# Patient Record
Sex: MALE | Race: WHITE | NOT HISPANIC OR LATINO | Employment: OTHER | URBAN - NONMETROPOLITAN AREA
[De-identification: names, ages, dates, MRNs, and addresses within clinical notes are randomized per-mention and may not be internally consistent; named-entity substitution may affect disease eponyms.]

---

## 2017-01-05 ENCOUNTER — TELEPHONE (OUTPATIENT)
Dept: UROLOGY | Facility: CLINIC | Age: 72
End: 2017-01-05

## 2017-01-05 NOTE — TELEPHONE ENCOUNTER
Called the patient and he stated that he has received the medication.      ----- Message from Sheree Verduzco MA sent at 1/3/2017  1:14 PM CST -----  [facility] hasnt received any prescriptions   Please advise

## 2017-01-12 ENCOUNTER — TELEPHONE (OUTPATIENT)
Dept: UROLOGY | Facility: CLINIC | Age: 72
End: 2017-01-12

## 2017-01-12 NOTE — TELEPHONE ENCOUNTER
----- Message from Alison Garcia sent at 1/12/2017  2:07 PM CST -----  Contact: MILEY LISA  Patient of Dr Haro. He put him on a medication for bladder control and it does not seem to be working. He especially has trouble at night. Can call home number [phone].

## 2017-01-12 NOTE — TELEPHONE ENCOUNTER
Patient advised to continue medication until follow up and he can report progress at that time. He needs to be on medication for three months to be documented as failed. Patient verbalized understanding.

## 2017-02-07 ENCOUNTER — OFFICE VISIT (OUTPATIENT)
Dept: NEUROLOGY | Facility: CLINIC | Age: 72
End: 2017-02-07

## 2017-02-07 VITALS
DIASTOLIC BLOOD PRESSURE: 80 MMHG | HEIGHT: 73 IN | WEIGHT: 253 LBS | BODY MASS INDEX: 33.53 KG/M2 | HEART RATE: 72 BPM | SYSTOLIC BLOOD PRESSURE: 138 MMHG

## 2017-02-07 DIAGNOSIS — I10 ESSENTIAL HYPERTENSION: ICD-10-CM

## 2017-02-07 DIAGNOSIS — G20 PARKINSON'S DISEASE (HCC): Primary | ICD-10-CM

## 2017-02-07 DIAGNOSIS — E78.5 HYPERLIPIDEMIA, UNSPECIFIED HYPERLIPIDEMIA TYPE: ICD-10-CM

## 2017-02-07 PROCEDURE — 99214 OFFICE O/P EST MOD 30 MIN: CPT | Performed by: CLINICAL NURSE SPECIALIST

## 2017-02-07 RX ORDER — AMANTADINE HYDROCHLORIDE 100 MG/1
100 CAPSULE, GELATIN COATED ORAL 2 TIMES DAILY
Qty: 60 CAPSULE | Refills: 2 | Status: SHIPPED | OUTPATIENT
Start: 2017-02-07

## 2017-02-07 NOTE — PROGRESS NOTES
Subjective     Chief Complaint   Patient presents with   • Tremors     tremors are worse         Darrick Max is a 71 y.o. male right handed retiree and . He was last seen 10/2016. He is here today for a problem visit.  He has had increased tremor between doses. Taking sinemet 25/100 0700, 1100, 1500 and CR 50/200 at 2000. At last visit patient had admitted to missing or prolonging time between doses and this has since been improved and takes more consistently.  He also is having more freezing gait. He was to start [employer] but unfortunately was not started.  He does occasionally get choked on food/liquids.        Dementia is well controlled with Aricept and wife denies decline.     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 He did run of the Klonopin and there was worsening symptoms but wife states as long as doses are not missed he does very well.        Parkinson's Disease   This is a chronic problem. The problem has been unchanged. Pertinent negatives include no chest pain, coughing, fatigue, fever, nausea, sore throat, vomiting or weakness. Associated symptoms comments: Bradykinesia, mask face, tremor. Now reporting a freezing gait and increase tremor between doses.   Dementia   This is a chronic problem. The problem has been unchanged. Pertinent negatives include no chest pain, coughing, fatigue, fever, nausea, sore throat, vomiting or weakness.        Current Outpatient Prescriptions   Medication Sig Dispense Refill   • aspirin 81 MG EC tablet Take 81 mg by mouth Daily.     • Boswellia-Glucosamine-Vit D (GLUCOSAMINE COMPLEX PO) Take 1,500 mg by mouth daily with breakfast.     • carbidopa-levodopa (SINEMET)  MG per tablet Take 1 tablet by mouth 3 (three) times a day.     • carbidopa-levodopa CR (SINEMET CR)  MG per CR tablet Take 1 tablet by mouth Every Night.     • clonazePAM (KlonoPIN) 0.5 MG tablet Take 1 tablet by mouth every night. 30 tablet 2   • clopidogrel  (PLAVIX) 75 MG tablet Take 1 tablet by mouth daily. 30 tablet 11   • coenzyme Q10 100 MG capsule Take 100 mg by mouth daily.     • docusate sodium (COLACE) 250 MG capsule Take 250 mg by mouth daily.     • donepezil (ARICEPT) 10 MG tablet Take 10 mg by mouth every night.     • furosemide (LASIX) 20 MG tablet Take 20 mg by mouth Daily.     • lisinopril (PRINIVIL,ZESTRIL) 40 MG tablet Take 40 mg by mouth daily.     • MEGARED OMEGA-3 KRILL OIL PO Take  by mouth every night.     • metoprolol tartrate (LOPRESSOR) 50 MG tablet Take 50 mg by mouth 2 (two) times a day.     • Mirabegron ER (MYRBETRIQ) 50 MG tablet sustained-release 24 hour Take 50 mg by mouth Daily. 30 tablet 11   • mirtazapine (REMERON) 30 MG tablet Take 30 mg by mouth every night. Takes half nightly     • nitroglycerin (NITROSTAT) 0.4 MG SL tablet Place 1 tablet under the tongue every 5 (five) minutes as needed for chest pain. Take no more than 3 doses in 15 minutes. 30 tablet 2   • pravastatin (PRAVACHOL) 40 MG tablet Take 20 mg by mouth every night.     • tamsulosin (FLOMAX) 0.4 MG capsule 24 hr capsule Take 1 capsule by mouth Every Night.     • amantadine (SYMMETREL) 100 MG capsule Take 1 capsule by mouth 2 (Two) Times a Day. 60 capsule 2     No current facility-administered medications for this visit.        Past Medical History   Diagnosis Date   • Arrhythmia    • Arthritis of knee, right    • BPH (benign prostatic hypertrophy)    • CAD (coronary artery disease)    • Hyperlipidemia    • Hypertension    • Parkinson disease    • Parkinson's disease        Past Surgical History   Procedure Laterality Date   • Coronary angioplasty with stent placement     • Tonsillectomy     • Pr rt/lt heart catheters N/A 9/23/2016     Procedure: Percutaneous Coronary Intervention;  Surgeon: Pedro Singh MD;  Location: [facility] CATH INVASIVE LOCATION;  Service: Cardiovascular   • Cardiac catheterization N/A 9/23/2016     Procedure: Left Heart Cath;  Surgeon: Juan J Salvador,  "MD;  Location:  [facility] CATH INVASIVE LOCATION;  Service:    • Cardiac catheterization N/A 9/23/2016     Procedure: Stent BMS coronary;  Surgeon: Pedro Singh MD;  Location:  [facility] CATH INVASIVE LOCATION;  Service:        family history includes Alzheimer's disease in his mother; Diabetes in his father; Hypertension in his mother.    Social History   Substance Use Topics   • Smoking status: Never Smoker   • Smokeless tobacco: Never Used   • Alcohol use No       Review of Systems   Constitutional: Negative.  Negative for fatigue and fever.   HENT: Negative.  Negative for drooling and sore throat.    Eyes: Negative.    Respiratory: Negative.  Negative for cough and choking.    Cardiovascular: Negative for chest pain.   Gastrointestinal: Negative.  Negative for constipation, diarrhea, nausea and vomiting.   Endocrine: Negative.    Genitourinary: Positive for dysuria.   Musculoskeletal: Positive for gait problem (shuffled gait). Negative for back pain.   Skin: Negative.    Allergic/Immunologic: Negative.    Neurological: Positive for tremors. Negative for dizziness, weakness and light-headedness. Speech difficulty: slow hypophonic speech.   Hematological: Negative.    Psychiatric/Behavioral: Negative.  Negative for agitation and confusion.   All other systems reviewed and are negative.      Objective     Visit Vitals   • /80   • Pulse 72   • Ht 73\" (185.4 cm)   • Wt 253 lb (115 kg)   • BMI 33.38 kg/m2   , Body mass index is 33.38 kg/(m^2).    Physical Exam   Constitutional: He is oriented to person, place, and time. Vital signs are normal. He appears well-developed and well-nourished.   HENT:   Head: Normocephalic and atraumatic.   Right Ear: Hearing and external ear normal.   Left Ear: Hearing and external ear normal.   Nose: Nose normal.   Mouth/Throat: Oropharynx is clear and moist and mucous membranes are normal.   Eyes: EOM and lids are normal. Pupils are equal, round, and reactive to light.   Neck: Normal " range of motion. Neck supple. Carotid bruit is not present.   Cardiovascular: Normal rate, regular rhythm, S1 normal, S2 normal and normal heart sounds.    No murmur heard.  Pulmonary/Chest: Effort normal and breath sounds normal.   Abdominal: Soft. Bowel sounds are normal.   Musculoskeletal: Normal range of motion.   Neurological: He is alert and oriented to person, place, and time. He has normal strength and normal reflexes. He displays tremor. No cranial nerve deficit or sensory deficit. He exhibits abnormal muscle tone (mild increase tone on right. ). He displays a negative Romberg sign. Gait (shuffled gait and bradykinesia) abnormal. Coordination (no resting tremor noted today) normal.   Reflex Scores:       Tricep reflexes are 2+ on the right side and 2+ on the left side.       Bicep reflexes are 2+ on the right side and 2+ on the left side.       Brachioradialis reflexes are 2+ on the right side and 2+ on the left side.       Patellar reflexes are 2+ on the right side and 2+ on the left side.       Achilles reflexes are 2+ on the right side and 2+ on the left side.  CN II:  Visual fields full.  Pupils equally reactive to light  CN III, IV, VI:  Extraocular Muscles full with no signs of nystagmus  CN V:  Facial sensory is symmetric with no asymetries.  CN VII:  Facial motor symmetric  CN VIII:  Gross hearing intact bilaterally  CN IX:  Palate elevates symmetrically  CN X:  Palate elevates symmetrically  CN XI:  Shoulder shrug symmetric  CN XII:  Tongue is midline on protrusion    Patient has mask face and hypophonia.     Skin: Skin is warm and dry.   Psychiatric: He has a normal mood and affect. His speech is normal and behavior is normal. Cognition and memory are normal.   Nursing note and vitals reviewed.        ASSESSMENT/PLAN    Diagnoses and all orders for this visit:    Parkinson's disease    Hyperlipidemia, unspecified hyperlipidemia type    Essential hypertension    Other orders  -     amantadine  (SYMMETREL) 100 MG capsule; Take 1 capsule by mouth 2 (Two) Times a Day.    MEDICAL DECISION MAKIN. Will continue with Sinemet 25/100 TID and sinemet CR 50/200 at HS  2. Will add amantadine 100 mg BID  3. Will continue with Klonopin  4. Patient will benefit from PT but is expecting upcoming knee replacement  5. Encourage physical activity as able.       allergies and all known medications/prescriptions have been reviewed using resources available on this encounter.    Return in about 4 weeks (around 3/7/2017).        Trinity Fitch, MERI

## 2017-02-15 ENCOUNTER — OFFICE VISIT (OUTPATIENT)
Dept: UROLOGY | Facility: CLINIC | Age: 72
End: 2017-02-15

## 2017-02-15 VITALS
DIASTOLIC BLOOD PRESSURE: 78 MMHG | SYSTOLIC BLOOD PRESSURE: 132 MMHG | WEIGHT: 250 LBS | BODY MASS INDEX: 33.86 KG/M2 | TEMPERATURE: 97.5 F | HEIGHT: 72 IN

## 2017-02-15 DIAGNOSIS — N52.9 IMPOTENCE OF ORGANIC ORIGIN: ICD-10-CM

## 2017-02-15 DIAGNOSIS — N39.41 URGE INCONTINENCE: Primary | ICD-10-CM

## 2017-02-15 DIAGNOSIS — N31.9 NEUROGENIC BLADDER: ICD-10-CM

## 2017-02-15 DIAGNOSIS — N40.1 BPH (BENIGN PROSTATIC HYPERTROPHY) WITH URINARY OBSTRUCTION: ICD-10-CM

## 2017-02-15 DIAGNOSIS — N13.8 BPH (BENIGN PROSTATIC HYPERTROPHY) WITH URINARY OBSTRUCTION: ICD-10-CM

## 2017-02-15 PROCEDURE — 99213 OFFICE O/P EST LOW 20 MIN: CPT | Performed by: UROLOGY

## 2017-02-15 NOTE — PROGRESS NOTES
Subjective    Mr. Max is 71 y.o. male    Chief Complaint: Incontinence    History of Present Illness     Urinary Incontinence  Patient complains of urinary incontinence. This has been present for several years. Symptoms have worsened . Patient uses 2 pads/day. The patient leaks urine with with urge, with a full bladder. Patient describes the symptoms as frequent urination (2x per day), nocturia 2 times per night, the urge to urinate recurs again shortly following micturition, urge to urinate with little or no warning and voiding small amounts. Factors associated with symptoms include Parkinsons. Evaluation to date [facility]. Treatment to date includes Detrol LA with no improvement.     Erectile Dysfunction  Patient complains of erectile dysfunction. Onset of dysfunction was several years ago and was gradual in onset. Patient states the nature of difficulty is attaining erection. Full erections occur never. Partial erections occur never. Libido is not affected. Risk factors for ED include neurologic disease (Parkinsons). Patient denies history of diabetes mellitus. Previous treatment of ED includes PDE5 inhibitors.       The following portions of the patient's history were reviewed and updated as appropriate: allergies, current medications, past family history, past medical history, past social history, past surgical history and problem list.    Review of Systems   Constitutional: Negative for chills and fever.   Gastrointestinal: Negative for abdominal pain, anal bleeding and blood in stool.   Genitourinary: Positive for frequency and urgency. Negative for flank pain and hematuria.         Current Outpatient Prescriptions:   •  amantadine (SYMMETREL) 100 MG capsule, Take 1 capsule by mouth 2 (Two) Times a Day., Disp: 60 capsule, Rfl: 2  •  aspirin 81 MG EC tablet, Take 81 mg by mouth Daily., Disp: , Rfl:   •  Boswellia-Glucosamine-Vit D (GLUCOSAMINE COMPLEX PO), Take 1,500 mg by  mouth daily with breakfast., Disp: , Rfl:   •  carbidopa-levodopa (SINEMET)  MG per tablet, Take 1 tablet by mouth 3 (three) times a day., Disp: , Rfl:   •  carbidopa-levodopa CR (SINEMET CR)  MG per CR tablet, Take 1 tablet by mouth Every Night., Disp: , Rfl:   •  clonazePAM (KlonoPIN) 0.5 MG tablet, Take 1 tablet by mouth every night., Disp: 30 tablet, Rfl: 2  •  clopidogrel (PLAVIX) 75 MG tablet, Take 1 tablet by mouth daily., Disp: 30 tablet, Rfl: 11  •  coenzyme Q10 100 MG capsule, Take 100 mg by mouth daily., Disp: , Rfl:   •  docusate sodium (COLACE) 250 MG capsule, Take 250 mg by mouth daily., Disp: , Rfl:   •  donepezil (ARICEPT) 10 MG tablet, Take 10 mg by mouth every night., Disp: , Rfl:   •  furosemide (LASIX) 20 MG tablet, Take 20 mg by mouth Daily., Disp: , Rfl:   •  lisinopril (PRINIVIL,ZESTRIL) 40 MG tablet, Take 40 mg by mouth daily., Disp: , Rfl:   •  MEGARED OMEGA-3 KRILL OIL PO, Take  by mouth every night., Disp: , Rfl:   •  metoprolol tartrate (LOPRESSOR) 50 MG tablet, Take 50 mg by mouth 2 (two) times a day., Disp: , Rfl:   •  Mirabegron ER (MYRBETRIQ) 50 MG tablet sustained-release 24 hour, Take 50 mg by mouth Daily., Disp: 30 tablet, Rfl: 11  •  mirtazapine (REMERON) 30 MG tablet, Take 30 mg by mouth every night. Takes half nightly, Disp: , Rfl:   •  nitroglycerin (NITROSTAT) 0.4 MG SL tablet, Place 1 tablet under the tongue every 5 (five) minutes as needed for chest pain. Take no more than 3 doses in 15 minutes., Disp: 30 tablet, Rfl: 2  •  pravastatin (PRAVACHOL) 40 MG tablet, Take 20 mg by mouth every night., Disp: , Rfl:   •  tamsulosin (FLOMAX) 0.4 MG capsule 24 hr capsule, Take 1 capsule by mouth Every Night., Disp: , Rfl:     Past Medical History   Diagnosis Date   • Arrhythmia    • Arthritis of knee, right    • BPH (benign prostatic hypertrophy)    • CAD (coronary artery disease)    • Hyperlipidemia    • Hypertension    • Parkinson disease    • Parkinson's disease   "      Past Surgical History   Procedure Laterality Date   • Coronary angioplasty with stent placement     • Tonsillectomy     • Pr rt/lt heart catheters N/A 9/23/2016     Procedure: Percutaneous Coronary Intervention;  Surgeon: Pedro Singh MD;  Location:  [facility] CATH INVASIVE LOCATION;  Service: Cardiovascular   • Cardiac catheterization N/A 9/23/2016     Procedure: Left Heart Cath;  Surgeon: Juan J Salvador MD;  Location:  [facility] CATH INVASIVE LOCATION;  Service:    • Cardiac catheterization N/A 9/23/2016     Procedure: Stent BMS coronary;  Surgeon: Pedro Singh MD;  Location:  [facility] CATH INVASIVE LOCATION;  Service:        Social History     Social History   • Marital status: Single     Spouse name: N/A   • Number of children: N/A   • Years of education: N/A     Social History Main Topics   • Smoking status: Never Smoker   • Smokeless tobacco: Never Used   • Alcohol use No   • Drug use: No   • Sexual activity: Defer     Other Topics Concern   • None     Social History Narrative    PATIENT HAS ONLY LIVED IN THIS AREA FOR 7-8 MONTHS.  HE IS FROM [place].  SAYS HE MISSES HIS HOME STATE.  HIS MALE COUSIN IS HIS NEXT OF KIN       Family History   Problem Relation Age of Onset   • Hypertension Mother    • Alzheimer's disease Mother    • Diabetes Father        Objective    Visit Vitals   • /78   • Temp 97.5 °F (36.4 °C)   • Ht 72\" (182.9 cm)   • Wt 250 lb (113 kg)   • BMI 33.91 kg/m2       Physical Exam   Constitutional: He is oriented to person, place, and time. He appears well-developed and well-nourished. No distress.   Pulmonary/Chest: Effort normal.   Abdominal: Soft. He exhibits no distension and no mass. There is no tenderness. There is no rebound and no guarding. No hernia.   Neurological: He is alert and oriented to person, place, and time.   Skin: Skin is warm and dry. He is not diaphoretic.   Psychiatric: He has a normal mood and affect.   Vitals reviewed.          Results for orders placed or " performed in visit on 11/14/16   POC Urinalysis Dipstick, Automated   Result Value Ref Range    Color Yellow Yellow, Straw, Dark Yellow, [place]    Clarity, UA Clear Clear    Glucose, UA Negative Negative mg/dL    Bilirubin Negative Negative    Ketones, UA 15 mg/dL (A) Negative    Specific Gravity  1.030 1.005 - 1.030    Blood, UA Negative Negative    pH, Urine 6.0 5.0 - 8.0    Protein, POC Negative Negative mg/dL    Urobilinogen, UA Normal Normal    Leukocytes Negative Negative    Nitrite, UA Negative Negative     Assessment and Plan    Darrick was seen today for urinary incontinence.    Diagnoses and all orders for this visit:    Urge incontinence    Neurogenic bladder    Impotence of organic origin    BPH (benign prostatic hypertrophy) with urinary obstruction      Patient is moving back to [place].  Needs urodynamics next available.  Then will likely need botox.

## 2017-02-18 ENCOUNTER — APPOINTMENT (OUTPATIENT)
Dept: GENERAL RADIOLOGY | Facility: HOSPITAL | Age: 72
End: 2017-02-18

## 2017-02-18 ENCOUNTER — HOSPITAL ENCOUNTER (INPATIENT)
Facility: HOSPITAL | Age: 72
LOS: 3 days | Discharge: NURSING, REHAB OR LONG-TERM CARE | End: 2017-02-21
Attending: EMERGENCY MEDICINE | Admitting: INTERNAL MEDICINE

## 2017-02-18 ENCOUNTER — APPOINTMENT (OUTPATIENT)
Dept: CT IMAGING | Facility: HOSPITAL | Age: 72
End: 2017-02-18

## 2017-02-18 DIAGNOSIS — I50.21 ACUTE SYSTOLIC CONGESTIVE HEART FAILURE (HCC): Primary | ICD-10-CM

## 2017-02-18 DIAGNOSIS — Z74.09 IMPAIRED FUNCTIONAL MOBILITY, BALANCE, GAIT, AND ENDURANCE: ICD-10-CM

## 2017-02-18 DIAGNOSIS — Z78.9 DECREASED ACTIVITIES OF DAILY LIVING (ADL): ICD-10-CM

## 2017-02-18 LAB
ALBUMIN SERPL-MCNC: 4.1 G/DL (ref 3.5–5)
ALBUMIN/GLOB SERPL: 1.2 G/DL (ref 1.1–2.5)
ALP SERPL-CCNC: 109 U/L (ref 24–120)
ALT SERPL W P-5'-P-CCNC: 18 U/L (ref 0–54)
ANION GAP SERPL CALCULATED.3IONS-SCNC: 10 MMOL/L (ref 4–13)
APTT PPP: 29.2 SECONDS (ref 24.1–34.8)
AST SERPL-CCNC: 20 U/L (ref 7–45)
BACTERIA UR QL AUTO: ABNORMAL /HPF
BASOPHILS # BLD AUTO: 0.02 10*3/MM3 (ref 0–0.2)
BASOPHILS NFR BLD AUTO: 0.2 % (ref 0–2)
BILIRUB SERPL-MCNC: 0.6 MG/DL (ref 0.1–1)
BILIRUB UR QL STRIP: NEGATIVE
BUN BLD-MCNC: 28 MG/DL (ref 5–21)
BUN/CREAT SERPL: 28.3 (ref 7–25)
CALCIUM SPEC-SCNC: 10 MG/DL (ref 8.4–10.4)
CHLORIDE SERPL-SCNC: 103 MMOL/L (ref 98–110)
CLARITY UR: CLEAR
CO2 SERPL-SCNC: 28 MMOL/L (ref 24–31)
COLOR UR: YELLOW
CREAT BLD-MCNC: 0.99 MG/DL (ref 0.5–1.4)
D DIMER PPP FEU-MCNC: 3.59 MG/L (FEU) (ref 0–0.5)
D-LACTATE SERPL-SCNC: 0.9 MMOL/L (ref 0.5–2)
DEPRECATED RDW RBC AUTO: 40.1 FL (ref 40–54)
EOSINOPHIL # BLD AUTO: 0.07 10*3/MM3 (ref 0–0.7)
EOSINOPHIL NFR BLD AUTO: 0.8 % (ref 0–4)
ERYTHROCYTE [DISTWIDTH] IN BLOOD BY AUTOMATED COUNT: 13.2 % (ref 12–15)
GFR SERPL CREATININE-BSD FRML MDRD: 75 ML/MIN/1.73
GLOBULIN UR ELPH-MCNC: 3.3 GM/DL
GLUCOSE BLD-MCNC: 96 MG/DL (ref 70–100)
GLUCOSE UR STRIP-MCNC: NEGATIVE MG/DL
HCT VFR BLD AUTO: 34.1 % (ref 40–52)
HGB BLD-MCNC: 10.3 G/DL (ref 14–18)
HGB UR QL STRIP.AUTO: NEGATIVE
HYALINE CASTS UR QL AUTO: ABNORMAL /LPF
IMM GRANULOCYTES # BLD: 0.03 10*3/MM3 (ref 0–0.03)
IMM GRANULOCYTES NFR BLD: 0.4 % (ref 0–5)
INR PPP: 0.97 (ref 0.91–1.09)
KETONES UR QL STRIP: NEGATIVE
LEUKOCYTE ESTERASE UR QL STRIP.AUTO: ABNORMAL
LYMPHOCYTES # BLD AUTO: 1.21 10*3/MM3 (ref 0.72–4.86)
LYMPHOCYTES NFR BLD AUTO: 14.3 % (ref 15–45)
MAGNESIUM SERPL-MCNC: 1.9 MG/DL (ref 1.4–2.2)
MCH RBC QN AUTO: 25.4 PG (ref 28–32)
MCHC RBC AUTO-ENTMCNC: 30.2 G/DL (ref 33–36)
MCV RBC AUTO: 84.2 FL (ref 82–95)
MONOCYTES # BLD AUTO: 0.78 10*3/MM3 (ref 0.19–1.3)
MONOCYTES NFR BLD AUTO: 9.2 % (ref 4–12)
NEUTROPHILS # BLD AUTO: 6.37 10*3/MM3 (ref 1.87–8.4)
NEUTROPHILS NFR BLD AUTO: 75.1 % (ref 39–78)
NITRITE UR QL STRIP: NEGATIVE
NT-PROBNP SERPL-MCNC: 2120 PG/ML (ref 0–900)
PH UR STRIP.AUTO: 6 [PH] (ref 5–8)
PLATELET # BLD AUTO: 274 10*3/MM3 (ref 130–400)
PMV BLD AUTO: 10 FL (ref 6–12)
POTASSIUM BLD-SCNC: 4.2 MMOL/L (ref 3.5–5.3)
PROT SERPL-MCNC: 7.4 G/DL (ref 6.3–8.7)
PROT UR QL STRIP: NEGATIVE
PROTHROMBIN TIME: 13.2 SECONDS (ref 11.9–14.6)
RBC # BLD AUTO: 4.05 10*6/MM3 (ref 4.8–5.9)
RBC # UR: ABNORMAL /HPF
REF LAB TEST METHOD: ABNORMAL
SODIUM BLD-SCNC: 141 MMOL/L (ref 135–145)
SP GR UR STRIP: 1.01 (ref 1–1.03)
SQUAMOUS #/AREA URNS HPF: ABNORMAL /HPF
TROPONIN I SERPL-MCNC: 0 NG/ML (ref 0–0.07)
TROPONIN I SERPL-MCNC: <0.012 NG/ML (ref 0–0.03)
UROBILINOGEN UR QL STRIP: ABNORMAL
WBC NRBC COR # BLD: 8.48 10*3/MM3 (ref 4.8–10.8)
WBC UR QL AUTO: ABNORMAL /HPF

## 2017-02-18 PROCEDURE — 71010 HC CHEST PA OR AP: CPT

## 2017-02-18 PROCEDURE — 80053 COMPREHEN METABOLIC PANEL: CPT | Performed by: EMERGENCY MEDICINE

## 2017-02-18 PROCEDURE — 83880 ASSAY OF NATRIURETIC PEPTIDE: CPT | Performed by: EMERGENCY MEDICINE

## 2017-02-18 PROCEDURE — 94799 UNLISTED PULMONARY SVC/PX: CPT

## 2017-02-18 PROCEDURE — 25010000002 FUROSEMIDE PER 20 MG: Performed by: EMERGENCY MEDICINE

## 2017-02-18 PROCEDURE — 85730 THROMBOPLASTIN TIME PARTIAL: CPT | Performed by: EMERGENCY MEDICINE

## 2017-02-18 PROCEDURE — 84484 ASSAY OF TROPONIN QUANT: CPT

## 2017-02-18 PROCEDURE — 83605 ASSAY OF LACTIC ACID: CPT | Performed by: EMERGENCY MEDICINE

## 2017-02-18 PROCEDURE — 84484 ASSAY OF TROPONIN QUANT: CPT | Performed by: INTERNAL MEDICINE

## 2017-02-18 PROCEDURE — 85610 PROTHROMBIN TIME: CPT | Performed by: EMERGENCY MEDICINE

## 2017-02-18 PROCEDURE — 99285 EMERGENCY DEPT VISIT HI MDM: CPT

## 2017-02-18 PROCEDURE — 25010000002 LEVOFLOXACIN PER 250 MG: Performed by: INTERNAL MEDICINE

## 2017-02-18 PROCEDURE — 71275 CT ANGIOGRAPHY CHEST: CPT

## 2017-02-18 PROCEDURE — 83735 ASSAY OF MAGNESIUM: CPT | Performed by: INTERNAL MEDICINE

## 2017-02-18 PROCEDURE — 81001 URINALYSIS AUTO W/SCOPE: CPT | Performed by: INTERNAL MEDICINE

## 2017-02-18 PROCEDURE — 0 IOPAMIDOL PER 1 ML: Performed by: EMERGENCY MEDICINE

## 2017-02-18 PROCEDURE — 93010 ELECTROCARDIOGRAM REPORT: CPT | Performed by: INTERNAL MEDICINE

## 2017-02-18 PROCEDURE — 85025 COMPLETE CBC W/AUTO DIFF WBC: CPT | Performed by: EMERGENCY MEDICINE

## 2017-02-18 PROCEDURE — 85379 FIBRIN DEGRADATION QUANT: CPT | Performed by: EMERGENCY MEDICINE

## 2017-02-18 PROCEDURE — 93005 ELECTROCARDIOGRAM TRACING: CPT | Performed by: EMERGENCY MEDICINE

## 2017-02-18 RX ORDER — NITROGLYCERIN 0.4 MG/1
0.4 TABLET SUBLINGUAL
Status: DISCONTINUED | OUTPATIENT
Start: 2017-02-18 | End: 2017-02-18 | Stop reason: SDUPTHER

## 2017-02-18 RX ORDER — ASPIRIN 81 MG/1
81 TABLET ORAL DAILY
Status: DISCONTINUED | OUTPATIENT
Start: 2017-02-19 | End: 2017-02-21 | Stop reason: HOSPADM

## 2017-02-18 RX ORDER — NITROGLYCERIN 0.4 MG/1
0.4 TABLET SUBLINGUAL
Status: DISCONTINUED | OUTPATIENT
Start: 2017-02-18 | End: 2017-02-21 | Stop reason: HOSPADM

## 2017-02-18 RX ORDER — CARBIDOPA AND LEVODOPA 50; 200 MG/1; MG/1
1 TABLET, EXTENDED RELEASE ORAL NIGHTLY
Status: DISCONTINUED | OUTPATIENT
Start: 2017-02-18 | End: 2017-02-21 | Stop reason: HOSPADM

## 2017-02-18 RX ORDER — LISINOPRIL 20 MG/1
20 TABLET ORAL DAILY
Status: DISCONTINUED | OUTPATIENT
Start: 2017-02-19 | End: 2017-02-21 | Stop reason: HOSPADM

## 2017-02-18 RX ORDER — PRAVASTATIN SODIUM 20 MG
20 TABLET ORAL NIGHTLY
Status: DISCONTINUED | OUTPATIENT
Start: 2017-02-18 | End: 2017-02-21 | Stop reason: HOSPADM

## 2017-02-18 RX ORDER — CLOPIDOGREL BISULFATE 75 MG/1
75 TABLET ORAL DAILY
Status: DISCONTINUED | OUTPATIENT
Start: 2017-02-19 | End: 2017-02-21 | Stop reason: HOSPADM

## 2017-02-18 RX ORDER — ONDANSETRON 2 MG/ML
4 INJECTION INTRAMUSCULAR; INTRAVENOUS EVERY 6 HOURS PRN
Status: DISCONTINUED | OUTPATIENT
Start: 2017-02-18 | End: 2017-02-21 | Stop reason: HOSPADM

## 2017-02-18 RX ORDER — SODIUM CHLORIDE 0.9 % (FLUSH) 0.9 %
1-10 SYRINGE (ML) INJECTION AS NEEDED
Status: DISCONTINUED | OUTPATIENT
Start: 2017-02-18 | End: 2017-02-21 | Stop reason: HOSPADM

## 2017-02-18 RX ORDER — AMANTADINE HYDROCHLORIDE 100 MG/1
100 TABLET ORAL EVERY 12 HOURS SCHEDULED
Status: DISCONTINUED | OUTPATIENT
Start: 2017-02-18 | End: 2017-02-21 | Stop reason: HOSPADM

## 2017-02-18 RX ORDER — DONEPEZIL HYDROCHLORIDE 10 MG/1
10 TABLET, FILM COATED ORAL NIGHTLY
Status: DISCONTINUED | OUTPATIENT
Start: 2017-02-18 | End: 2017-02-21 | Stop reason: HOSPADM

## 2017-02-18 RX ORDER — ACETAMINOPHEN 325 MG/1
650 TABLET ORAL EVERY 6 HOURS PRN
Status: DISCONTINUED | OUTPATIENT
Start: 2017-02-18 | End: 2017-02-21 | Stop reason: HOSPADM

## 2017-02-18 RX ORDER — FUROSEMIDE 10 MG/ML
40 INJECTION INTRAMUSCULAR; INTRAVENOUS 2 TIMES DAILY
Status: DISCONTINUED | OUTPATIENT
Start: 2017-02-19 | End: 2017-02-20

## 2017-02-18 RX ORDER — FUROSEMIDE 10 MG/ML
40 INJECTION INTRAMUSCULAR; INTRAVENOUS ONCE
Status: COMPLETED | OUTPATIENT
Start: 2017-02-18 | End: 2017-02-18

## 2017-02-18 RX ORDER — SODIUM CHLORIDE 0.9 % (FLUSH) 0.9 %
10 SYRINGE (ML) INJECTION AS NEEDED
Status: DISCONTINUED | OUTPATIENT
Start: 2017-02-18 | End: 2017-02-21 | Stop reason: HOSPADM

## 2017-02-18 RX ORDER — METOPROLOL TARTRATE 50 MG/1
50 TABLET, FILM COATED ORAL EVERY 12 HOURS SCHEDULED
Status: DISCONTINUED | OUTPATIENT
Start: 2017-02-18 | End: 2017-02-21 | Stop reason: HOSPADM

## 2017-02-18 RX ORDER — IPRATROPIUM BROMIDE AND ALBUTEROL SULFATE 2.5; .5 MG/3ML; MG/3ML
3 SOLUTION RESPIRATORY (INHALATION) EVERY 4 HOURS PRN
Status: DISCONTINUED | OUTPATIENT
Start: 2017-02-18 | End: 2017-02-21 | Stop reason: HOSPADM

## 2017-02-18 RX ORDER — MIRTAZAPINE 15 MG/1
15 TABLET, FILM COATED ORAL NIGHTLY
Status: DISCONTINUED | OUTPATIENT
Start: 2017-02-18 | End: 2017-02-21 | Stop reason: HOSPADM

## 2017-02-18 RX ORDER — TAMSULOSIN HYDROCHLORIDE 0.4 MG/1
0.4 CAPSULE ORAL NIGHTLY
Status: DISCONTINUED | OUTPATIENT
Start: 2017-02-18 | End: 2017-02-21 | Stop reason: HOSPADM

## 2017-02-18 RX ORDER — LEVOFLOXACIN 5 MG/ML
500 INJECTION, SOLUTION INTRAVENOUS EVERY 24 HOURS
Status: DISCONTINUED | OUTPATIENT
Start: 2017-02-18 | End: 2017-02-19

## 2017-02-18 RX ADMIN — IOPAMIDOL 150 ML: 755 INJECTION, SOLUTION INTRAVENOUS at 19:03

## 2017-02-18 RX ADMIN — LEVOFLOXACIN 500 MG: 5 INJECTION, SOLUTION INTRAVENOUS at 23:48

## 2017-02-18 RX ADMIN — MIRTAZAPINE 15 MG: 15 TABLET, FILM COATED ORAL at 23:16

## 2017-02-18 RX ADMIN — METOPROLOL TARTRATE 50 MG: 50 TABLET ORAL at 23:16

## 2017-02-18 RX ADMIN — TAMSULOSIN HYDROCHLORIDE 0.4 MG: 0.4 CAPSULE ORAL at 23:16

## 2017-02-18 RX ADMIN — DONEPEZIL HYDROCHLORIDE 10 MG: 10 TABLET, FILM COATED ORAL at 23:16

## 2017-02-18 RX ADMIN — AMANTADINE HYDROCHLORIDE 100 MG: 100 TABLET ORAL at 23:17

## 2017-02-18 RX ADMIN — FUROSEMIDE 40 MG: 10 INJECTION, SOLUTION INTRAMUSCULAR; INTRAVENOUS at 19:57

## 2017-02-18 RX ADMIN — CARBIDOPA AND LEVODOPA 1 TABLET: 50; 200 TABLET, EXTENDED RELEASE ORAL at 23:48

## 2017-02-18 RX ADMIN — PRAVASTATIN SODIUM 20 MG: 20 TABLET ORAL at 23:17

## 2017-02-18 NOTE — ED PROVIDER NOTES
"Subjective   HPI Comments: The patient presents to the emergency room by eminence with a complaint from him and his wife that he has been getting weaker and weaker.  The patient's mentation initial complaint was of pain in his right knee.  He says he has plans for knee replacement but that is about 2 months away still because her waiting for his medication for his heart to be taken away after his stent 6 months ago however when his wife gets here she also talks about the fact that he is extremely weak and she is unable to get him and move around.  She says she is having a difficult time getting her cleaned and getting him to the bathroom.  [facility] and [facility] come to the emergency room at [facility] in the \"transitioned\" to [facility].    Patient is a 71 y.o. male presenting with weakness.   History provided by:  Patient and spouse   used: No    Weakness - Generalized   Severity:  Severe  Onset quality:  Gradual  Duration: Months but gettin worse recently.  Timing:  Constant  Progression:  Worsening  Chronicity:  Chronic  Context: not alcohol use, not allergies, not change in medication, not decreased sleep, not dehydration, not drug use, not increased activity, not pinched nerve, not recent infection, not stress and not urinary tract infection    Relieved by:  Nothing  Worsened by:  Nothing  Ineffective treatments:  None tried  Associated symptoms: arthralgias and shortness of breath    Associated symptoms: no abdominal pain, no anorexia, no aphasia, no ataxia, no chest pain, no cough, no diarrhea, no difficulty walking, no dizziness, no drooling, no dysphagia, no dysuria, no numbness in extremities, no falls, no fever, no foul-smelling urine, no frequency, no headaches, no hematochezia, no lethargy, no loss of consciousness, no melena, no myalgias, no nausea, no near-syncope, no seizures, no sensory-motor deficit, no stroke symptoms, no " syncope, no urgency, no vision change and no vomiting    Risk factors: neurologic disease    Risk factors: no anemia, no congestive heart failure, no coronary artery disease, no diabetes, no excessive menstruation, no family hx of stroke, no heart disease, no new medications and no recent stressors        Review of Systems   Constitutional: Negative.  Negative for fever.   HENT: Negative.  Negative for drooling.    Respiratory: Positive for shortness of breath. Negative for cough.    Cardiovascular: Negative.  Negative for chest pain, syncope and near-syncope.   Gastrointestinal: Negative.  Negative for abdominal pain, anorexia, diarrhea, dysphagia, hematochezia, melena, nausea and vomiting.   Genitourinary: Negative.  Negative for dysuria, frequency and urgency.   Musculoskeletal: Positive for arthralgias. Negative for falls and myalgias.   Neurological: Negative for dizziness, seizures, loss of consciousness and headaches.   Hematological: Negative.    All other systems reviewed and are negative.      Past Medical History   Diagnosis Date   • Acute systolic congestive heart failure 2/18/2017   • Arrhythmia    • Arthritis of knee, right    • BPH (benign prostatic hypertrophy)    • CAD (coronary artery disease)    • Hyperlipidemia    • Hypertension    • Parkinson disease    • Parkinson's disease        Allergies   Allergen Reactions   • Penicillins        Past Surgical History   Procedure Laterality Date   • Coronary angioplasty with stent placement     • Tonsillectomy     • Pr rt/lt heart catheters N/A 9/23/2016     Procedure: Percutaneous Coronary Intervention;  Surgeon: Pedro Singh MD;  Location:  [facility] CATH [facility]ASIVE LOCATION;  Service: Cardiovascular   • Cardiac catheterization N/A 9/23/2016     Procedure: Left Heart Cath;  Surgeon: Juan J Salvador MD;  Location:  [facility] CATH [facility] LOCATION;  Service:    • Cardiac catheterization N/A 9/23/2016     Procedure: Stent BMS coronary;  Surgeon: Pedro Singh MD;   Location: [facility] LOCATION;  Service:        Family History   Problem Relation Age of Onset   • Hypertension Mother    • Alzheimer's disease Mother    • Diabetes Father        Social History     Social History   • Marital status: Single     Spouse name: N/A   • Number of children: N/A   • Years of education: N/A     Social History Main Topics   • Smoking status: Never Smoker   • Smokeless tobacco: Never Used   • Alcohol use No   • Drug use: No   • Sexual activity: Defer     Other Topics Concern   • None     Social History Narrative    PATIENT HAS ONLY LIVED IN THIS AREA FOR 7-8 MONTHS.  HE IS FROM [place].  SAYS HE MISSES HIS HOME STATE.  HIS MALE COUSIN IS HIS NEXT OF KIN       Prior to Admission medications    Medication Sig Start Date End Date Taking? Authorizing Provider   amantadine (SYMMETREL) 100 MG capsule Take 1 capsule by mouth 2 (Two) Times a Day. 2/7/17  Yes MERI Welch   aspirin 81 MG EC tablet Take 81 mg by mouth Daily.   Yes Historical Provider, MD   Boswellia-Glucosamine-Vit D (GLUCOSAMINE COMPLEX PO) Take 1,500 mg by mouth daily with breakfast.   Yes Historical Provider, MD   carbidopa-levodopa (SINEMET)  MG per tablet Take 1 tablet by mouth 3 (three) times a day.   Yes Historical Provider, MD   carbidopa-levodopa CR (SINEMET CR)  MG per CR tablet Take 1 tablet by mouth Every Night.   Yes Historical Provider, MD   clonazePAM (KlonoPIN) 0.5 MG tablet Take 1 tablet by mouth every night. 9/20/16  Yes Ortega Alberts MD   clopidogrel (PLAVIX) 75 MG tablet Take 1 tablet by mouth daily. 9/24/16  Yes MERI Wolfe   coenzyme Q10 100 MG capsule Take 100 mg by mouth daily.   Yes Historical Provider, MD   docusate sodium (COLACE) 250 MG capsule Take 250 mg by mouth daily.   Yes Historical Provider, MD   donepezil (ARICEPT) 10 MG tablet Take 10 mg by mouth every night.   Yes Historical Provider, MD   furosemide (LASIX) 20 MG tablet Take 20 mg by mouth Daily.    Yes Historical Provider, MD   lisinopril (PRINIVIL,ZESTRIL) 40 MG tablet Take 20 mg by mouth Daily.   Yes Historical Provider, MD   MEGARED OMEGA-3 KRILL OIL PO Take  by mouth every night.   Yes Historical Provider, MD   metoprolol tartrate (LOPRESSOR) 50 MG tablet Take 50 mg by mouth 2 (two) times a day.   Yes Historical Provider, MD   Mirabegron ER (MYRBETRIQ) 50 MG tablet sustained-release 24 hour Take 50 mg by mouth Daily. 11/14/16  Yes Theron Haro MD   mirtazapine (REMERON) 30 MG tablet Take 15 mg by mouth Every Night. Takes half nightly   Yes Historical Provider, MD   nitroglycerin (NITROSTAT) 0.4 MG SL tablet Place 1 tablet under the tongue every 5 (five) minutes as needed for chest pain. Take no more than 3 doses in 15 minutes. 9/24/16  Yes MERI Wolfe   pravastatin (PRAVACHOL) 40 MG tablet Take 20 mg by mouth every night.   Yes Historical Provider, MD   tamsulosin (FLOMAX) 0.4 MG capsule 24 hr capsule Take 1 capsule by mouth Every Night.   Yes Historical Provider, MD       Medications   sodium chloride 0.9 % flush 10 mL (not administered)   amantadine (SYMMETREL) tablet 100 mg (100 mg Oral Given 2/18/17 2317)   aspirin EC tablet 81 mg (not administered)   clopidogrel (PLAVIX) tablet 75 mg (not administered)   donepezil (ARICEPT) tablet 10 mg (10 mg Oral Given 2/18/17 2316)   lisinopril (PRINIVIL,ZESTRIL) tablet 20 mg (not administered)   metoprolol tartrate (LOPRESSOR) tablet 50 mg (50 mg Oral Given 2/18/17 2316)   mirtazapine (REMERON) tablet 15 mg (15 mg Oral Given 2/18/17 2316)   nitroglycerin (NITROSTAT) SL tablet 0.4 mg (not administered)   pravastatin (PRAVACHOL) tablet 20 mg (20 mg Oral Given 2/18/17 2317)   tamsulosin (FLOMAX) 24 hr capsule 0.4 mg (0.4 mg Oral Given 2/18/17 2316)   sodium chloride 0.9 % flush 1-10 mL (not administered)   enoxaparin (LOVENOX) syringe 40 mg (not administered)   acetaminophen (TYLENOL) tablet 650 mg (not administered)   ondansetron (ZOFRAN)  injection 4 mg (not administered)   ipratropium-albuterol (DUO-NEB) nebulizer solution 3 mL (not administered)   furosemide (LASIX) injection 40 mg (not administered)   carbidopa-levodopa (SINEMET)  MG per tablet 1 tablet (not administered)   carbidopa-levodopa CR (SINEMET CR)  MG per CR tablet 1 tablet (1 tablet Oral Given 2/18/17 2348)   levoFLOXacin (LEVAQUIN) 500 mg/100 mL D5W (premix) (LEVAQUIN) 500 mg (500 mg Intravenous New Bag 2/18/17 2348)   iopamidol (ISOVUE-370) 76 % injection 150 mL (150 mL Intravenous Given 2/18/17 1903)   furosemide (LASIX) injection 40 mg (40 mg Intravenous Given 2/18/17 1957)       Vitals:    02/19/17 0346   BP: 134/60   Pulse: 69   Resp: 20   Temp: 97.4 °F (36.3 °C)   SpO2: 97%         Objective   Physical Exam   Constitutional: He is oriented to person, place, and time. He appears well-developed and well-nourished.   HENT:   Head: Normocephalic and atraumatic.   Eyes: EOM are normal. Pupils are equal, round, and reactive to light.   Neck: Normal range of motion. Neck supple.   Cardiovascular: Normal rate and regular rhythm.    Pulmonary/Chest: Breath sounds normal.   Slight tachypneic but not great distress   Abdominal: Soft. Bowel sounds are normal.   Moderately obese.   Musculoskeletal:   Patient has pain with movement of his right knee.   Neurological: He is alert and oriented to person, place, and time.   Patient has stiff movements of face and extremities consistent with Parkinsonism   Skin: Skin is warm and dry.   Nursing note and vitals reviewed.      Procedures         Lab Results (last 24 hours)     Procedure Component Value Units Date/Time    CBC & Differential [[id number]] Collected:  02/18/17 1629    Specimen:  Blood Updated:  02/18/17 1647    Narrative:       The following orders were created for panel order CBC & Differential.  Procedure                               Abnormality         Status                     ---------                                -----------         ------                     CBC Auto Differential[[id number]]         Abnormal            Final result                 Please view results for these tests on the individual orders.    Comprehensive Metabolic Panel [[id number]]  (Abnormal) Collected:  02/18/17 1629    Specimen:  Blood from Arm, Left Updated:  02/18/17 1653     Glucose 96 mg/dL      BUN 28 (H) mg/dL      Creatinine 0.99 mg/dL      Sodium 141 mmol/L      Potassium 4.2 mmol/L      Chloride 103 mmol/L      CO2 28.0 mmol/L      Calcium 10.0 mg/dL      Total Protein 7.4 g/dL      Albumin 4.10 g/dL      ALT (SGPT) 18 U/L      AST (SGOT) 20 U/L      Alkaline Phosphatase 109 U/L      Total Bilirubin 0.6 mg/dL      eGFR Non African Amer 75 mL/min/1.73      Globulin 3.3 gm/dL      A/G Ratio 1.2 g/dL      BUN/Creatinine Ratio 28.3 (H)      Anion Gap 10.0 mmol/L     Narrative:       The MDRD GFR formula is only valid for adults with stable renal function between ages 18 and 70.    D-dimer, Quantitative [[id number]]  (Abnormal) Collected:  02/18/17 1629    Specimen:  Blood from Arm, Left Updated:  02/18/17 1659     D-Dimer, Quantitative 3.59 (H) mg/L (FEU)     Narrative:       Reference Range is 0-0.50 mg/L FEU. However, results <0.50 mg/L FEU tends to rule out DVT or PE. Results >0.50 mg/L FEU are not useful in predicting absence or presence of DVT or PE.    BNP [[id number]]  (Abnormal) Collected:  02/18/17 1629    Specimen:  Blood from Arm, Left Updated:  02/18/17 1700     proBNP 2120.0 (H) pg/mL     aPTT [[id number]]  (Normal) Collected:  02/18/17 1629    Specimen:  Blood from Arm, Left Updated:  02/18/17 1659     PTT 29.2 seconds     Protime-INR [[id number]]  (Normal) Collected:  02/18/17 1629    Specimen:  Blood from Arm, Left Updated:  02/18/17 1659     Protime 13.2 Seconds      INR 0.97     Lactic Acid, Plasma [[id number]]  (Normal) Collected:  02/18/17 1629    Specimen:  Blood from Arm, Left Updated:  02/18/17 1650     Lactate 0.9 mmol/L     CBC Auto  Differential [[id number]]  (Abnormal) Collected:  02/18/17 1629    Specimen:  Blood from Arm, Left Updated:  02/18/17 1647     WBC 8.48 10*3/mm3      RBC 4.05 (L) 10*6/mm3      Hemoglobin 10.3 (L) g/dL      Hematocrit 34.1 (L) %      MCV 84.2 fL      MCH 25.4 (L) pg      MCHC 30.2 (L) g/dL      RDW 13.2 %      RDW-SD 40.1 fl      MPV 10.0 fL      Platelets 274 10*3/mm3      Neutrophil % 75.1 %      Lymphocyte % 14.3 (L) %      Monocyte % 9.2 %      Eosinophil % 0.8 %      Basophil % 0.2 %      Immature Grans % 0.4 %      Neutrophils, Absolute 6.37 10*3/mm3      Lymphocytes, Absolute 1.21 10*3/mm3      Monocytes, Absolute 0.78 10*3/mm3      Eosinophils, Absolute 0.07 10*3/mm3      Basophils, Absolute 0.02 10*3/mm3      Immature Grans, Absolute 0.03 10*3/mm3     POC Troponin, Rapid [[id number]]  (Normal) Collected:  02/18/17 1659    Specimen:  Blood Updated:  02/18/17 1710     Troponin I 0.00 ng/mL       Serial Number: [id number]    : [id number]       Urinalysis With / Culture If Indicated [[id number]]  (Abnormal) Collected:  02/18/17 2040    Specimen:  Urine from Urine, Clean Catch Updated:  02/18/17 2056     Color, UA Yellow      Appearance, UA Clear      pH, UA 6.0      Specific Gravity, UA 1.010      Glucose, UA Negative      Ketones, UA Negative      Bilirubin, UA Negative      Blood, UA Negative      Protein, UA Negative      Leuk Esterase, UA Trace (A)      Nitrite, UA Negative      Urobilinogen, UA 1.0 E.U./dL     Urinalysis, Microscopic Only [[id number]]  (Abnormal) Collected:  02/18/17 2040    Specimen:  Urine from Urine, Clean Catch Updated:  02/18/17 2056     RBC, UA None Seen /HPF      WBC, UA 3-5 (A) /HPF      Bacteria, UA Trace (A) /HPF      Squamous Epithelial Cells, UA None Seen /HPF      Hyaline Casts, UA None Seen /LPF      Methodology Automated Microscopy     Troponin [[id number]]  (Normal) Collected:  02/18/17 2127    Specimen:  Blood Updated:  02/18/17 2157     Troponin I <0.012 ng/mL     Magnesium  [[id number]]  (Normal) Collected:  02/18/17 2127    Specimen:  Blood Updated:  02/18/17 2145     Magnesium 1.9 mg/dL     Troponin [[id number]]  (Normal) Collected:  02/19/17 0200    Specimen:  Blood Updated:  02/19/17 0240     Troponin I 0.015 ng/mL     Basic Metabolic Panel [[id number]]  (Abnormal) Collected:  02/19/17 0200    Specimen:  Blood Updated:  02/19/17 0230     Glucose 101 (H) mg/dL      BUN 22 (H) mg/dL      Creatinine 0.90 mg/dL      Sodium 140 mmol/L      Potassium 3.6 mmol/L      Chloride 104 mmol/L      CO2 26.0 mmol/L      Calcium 9.3 mg/dL      eGFR Non African Amer 83 mL/min/1.73      BUN/Creatinine Ratio 24.4      Anion Gap 10.0 mmol/L     Narrative:       The MDRD GFR formula is only valid for adults with stable renal function between ages 18 and 70.    CBC (No Diff) [[id number]]  (Abnormal) Collected:  02/19/17 0200    Specimen:  Blood Updated:  02/19/17 0217     WBC 6.36 10*3/mm3      RBC 3.80 (L) 10*6/mm3      Hemoglobin 9.5 (L) g/dL      Hematocrit 31.4 (L) %      MCV 82.6 fL      MCH 25.0 (L) pg      MCHC 30.3 (L) g/dL      RDW 13.3 %      RDW-SD 40.3 fl      MPV 10.0 fL      Platelets 265 10*3/mm3     Lipid Panel [[id number]]  (Abnormal) Collected:  02/19/17 0200    Specimen:  Blood Updated:  02/19/17 0241     Total Cholesterol 98 (L) mg/dL      Triglycerides 94 mg/dL      HDL Cholesterol 31 (L) mg/dL      LDL Cholesterol  45 mg/dL      LDL/HDL Ratio 1.55     Ferritin [[id number]]  (Abnormal) Collected:  02/19/17 0201    Specimen:  Blood Updated:  02/19/17 0308     Ferritin 16.40 (L) ng/mL     Iron Profile [[id number]]  (Abnormal) Collected:  02/19/17 0201    Specimen:  Blood Updated:  02/19/17 0245     Iron 55 mcg/dL      TIBC 316 mcg/dL      Iron Saturation 17 (L) %     Vitamin B12 [[id number]]  (Normal) Collected:  02/19/17 0201    Specimen:  Blood Updated:  02/19/17 0338     Vitamin B-12 289 pg/mL     Folate [[id number]] Collected:  02/19/17 0201    Specimen:  Blood Updated:  02/19/17 0338     Folate  6.69 ng/mL     TSH [[id number]]  (Normal) Collected:  02/19/17 0201    Specimen:  Blood Updated:  02/19/17 0304     TSH 1.440 mIU/mL           CT Angiogram Chest With Contrast   Final Result      XR Chest 1 View   Final Result          ED Course  ED Course   Comment By Time   I told the patient and his spouse that we could not just admit him to transition [facility] to [facility] as the [facility] counselor suggested.  If we found something that was an admittable diagnosis we would be more than happy to put him in the hospital but we had to find that admittable and treatable diagnosis first.We will check him out real good and see how he is doing.  The wife then spoke with [facility] the [facility] on [facility] phone and was told if we could not find an admittable diagnosis that they would go back home by ambulance. Dre Gee Jr., MD 02/18 1800   Patient was turned over to me is getting progressive worsening shortness of breath CT of the chest is negative for a pulmonary embolus his BNP is 2120  The  was consulted and they informing the patient can be admitted to the hospital for CHF some low-grade anemia German Hernández MD 02/18 1959   Left with Dr. Hernández at shift change. Dre Gee Jr., MD 02/19 0641          MDM  Number of Diagnoses or Management Options  Acute systolic congestive heart failure:       Final diagnoses:   Acute systolic congestive heart failure        Dre Gee Jr., MD  02/19/17 6404

## 2017-02-19 ENCOUNTER — APPOINTMENT (OUTPATIENT)
Dept: CARDIOLOGY | Facility: HOSPITAL | Age: 72
End: 2017-02-19
Attending: INTERNAL MEDICINE

## 2017-02-19 LAB
ANION GAP SERPL CALCULATED.3IONS-SCNC: 10 MMOL/L (ref 4–13)
ARTICHOKE IGE QN: 45 MG/DL (ref 0–99)
BH CV ECHO MEAS - AI DEC SLOPE: 116 CM/SEC^2
BH CV ECHO MEAS - AI MAX PG: 30 MMHG
BH CV ECHO MEAS - AI MAX VEL: 274 CM/SEC
BH CV ECHO MEAS - AI P1/2T: 691.8 MSEC
BH CV ECHO MEAS - AO MAX PG (FULL): 4.4 MMHG
BH CV ECHO MEAS - AO MAX PG: 7.3 MMHG
BH CV ECHO MEAS - AO MEAN PG (FULL): 2 MMHG
BH CV ECHO MEAS - AO MEAN PG: 4 MMHG
BH CV ECHO MEAS - AO ROOT AREA (BSA CORRECTED): 2
BH CV ECHO MEAS - AO ROOT AREA: 17.3 CM^2
BH CV ECHO MEAS - AO ROOT DIAM: 4.7 CM
BH CV ECHO MEAS - AO V2 MAX: 135 CM/SEC
BH CV ECHO MEAS - AO V2 MEAN: 87.4 CM/SEC
BH CV ECHO MEAS - AO V2 VTI: 28.2 CM
BH CV ECHO MEAS - AVA(I,A): 2.6 CM^2
BH CV ECHO MEAS - AVA(I,D): 2.6 CM^2
BH CV ECHO MEAS - AVA(V,A): 2.2 CM^2
BH CV ECHO MEAS - AVA(V,D): 2.2 CM^2
BH CV ECHO MEAS - BSA(HAYCOCK): 2.4 M^2
BH CV ECHO MEAS - BSA: 2.3 M^2
BH CV ECHO MEAS - BZI_BMI: 32.6 KILOGRAMS/M^2
BH CV ECHO MEAS - BZI_METRIC_HEIGHT: 182.9 CM
BH CV ECHO MEAS - BZI_METRIC_WEIGHT: 108.9 KG
BH CV ECHO MEAS - CONTRAST EF 4CH: 58.2 ML/M^2
BH CV ECHO MEAS - EDV(CUBED): 79.5 ML
BH CV ECHO MEAS - EDV(MOD-SP4): 33.7 ML
BH CV ECHO MEAS - EDV(TEICH): 83.1 ML
BH CV ECHO MEAS - EF(CUBED): 66.7 %
BH CV ECHO MEAS - EF(MOD-SP4): 58.2 %
BH CV ECHO MEAS - EF(TEICH): 58.5 %
BH CV ECHO MEAS - ESV(CUBED): 26.5 ML
BH CV ECHO MEAS - ESV(MOD-SP4): 14.1 ML
BH CV ECHO MEAS - ESV(TEICH): 34.4 ML
BH CV ECHO MEAS - FS: 30.7 %
BH CV ECHO MEAS - IVS/LVPW: 1
BH CV ECHO MEAS - IVSD: 1.4 CM
BH CV ECHO MEAS - LA DIMENSION: 3.5 CM
BH CV ECHO MEAS - LA/AO: 0.74
BH CV ECHO MEAS - LAT PEAK E' VEL: 4.6 CM/SEC
BH CV ECHO MEAS - LV DIASTOLIC VOL/BSA (35-75): 14.6 ML/M^2
BH CV ECHO MEAS - LV MASS(C)D: 217.4 GRAMS
BH CV ECHO MEAS - LV MASS(C)DI: 94.4 GRAMS/M^2
BH CV ECHO MEAS - LV MAX PG: 2.9 MMHG
BH CV ECHO MEAS - LV MEAN PG: 2 MMHG
BH CV ECHO MEAS - LV SYSTOLIC VOL/BSA (12-30): 6.1 ML/M^2
BH CV ECHO MEAS - LV V1 MAX: 85.4 CM/SEC
BH CV ECHO MEAS - LV V1 MEAN: 59.2 CM/SEC
BH CV ECHO MEAS - LV V1 VTI: 21.1 CM
BH CV ECHO MEAS - LVIDD: 4.3 CM
BH CV ECHO MEAS - LVIDS: 3 CM
BH CV ECHO MEAS - LVLD AP4: 7.3 CM
BH CV ECHO MEAS - LVLS AP4: 6.6 CM
BH CV ECHO MEAS - LVOT AREA (M): 3.5 CM^2
BH CV ECHO MEAS - LVOT AREA: 3.5 CM^2
BH CV ECHO MEAS - LVOT DIAM: 2.1 CM
BH CV ECHO MEAS - LVPWD: 1.3 CM
BH CV ECHO MEAS - MV A MAX VEL: 78.4 CM/SEC
BH CV ECHO MEAS - MV DEC TIME: 0.19 SEC
BH CV ECHO MEAS - MV E MAX VEL: 68.9 CM/SEC
BH CV ECHO MEAS - MV E/A: 0.88
BH CV ECHO MEAS - RAP SYSTOLE: 10 MMHG
BH CV ECHO MEAS - RVSP: 41.4 MMHG
BH CV ECHO MEAS - SI(AO): 212.5 ML/M^2
BH CV ECHO MEAS - SI(CUBED): 23 ML/M^2
BH CV ECHO MEAS - SI(LVOT): 31.7 ML/M^2
BH CV ECHO MEAS - SI(MOD-SP4): 8.5 ML/M^2
BH CV ECHO MEAS - SI(TEICH): 21.1 ML/M^2
BH CV ECHO MEAS - SV(AO): 489.3 ML
BH CV ECHO MEAS - SV(CUBED): 53 ML
BH CV ECHO MEAS - SV(LVOT): 73.1 ML
BH CV ECHO MEAS - SV(MOD-SP4): 19.6 ML
BH CV ECHO MEAS - SV(TEICH): 48.6 ML
BH CV ECHO MEAS - TR MAX VEL: 280 CM/SEC
BUN BLD-MCNC: 22 MG/DL (ref 5–21)
BUN/CREAT SERPL: 24.4 (ref 7–25)
CALCIUM SPEC-SCNC: 9.3 MG/DL (ref 8.4–10.4)
CHLORIDE SERPL-SCNC: 104 MMOL/L (ref 98–110)
CHOLEST SERPL-MCNC: 98 MG/DL (ref 130–200)
CO2 SERPL-SCNC: 26 MMOL/L (ref 24–31)
CREAT BLD-MCNC: 0.9 MG/DL (ref 0.5–1.4)
DEPRECATED RDW RBC AUTO: 40.3 FL (ref 40–54)
ERYTHROCYTE [DISTWIDTH] IN BLOOD BY AUTOMATED COUNT: 13.3 % (ref 12–15)
FERRITIN SERPL-MCNC: 16.4 NG/ML (ref 17.9–464)
FOLATE SERPL-MCNC: 6.69 NG/ML
GFR SERPL CREATININE-BSD FRML MDRD: 83 ML/MIN/1.73
GLUCOSE BLD-MCNC: 101 MG/DL (ref 70–100)
HCT VFR BLD AUTO: 31.4 % (ref 40–52)
HDLC SERPL-MCNC: 31 MG/DL
HGB BLD-MCNC: 9.5 G/DL (ref 14–18)
IRON 24H UR-MRATE: 55 MCG/DL (ref 42–180)
IRON SATN MFR SERPL: 17 % (ref 20–45)
LDLC/HDLC SERPL: 1.55 {RATIO}
LEFT ATRIUM VOLUME INDEX: 20.4 ML/M2
MCH RBC QN AUTO: 25 PG (ref 28–32)
MCHC RBC AUTO-ENTMCNC: 30.3 G/DL (ref 33–36)
MCV RBC AUTO: 82.6 FL (ref 82–95)
PLATELET # BLD AUTO: 265 10*3/MM3 (ref 130–400)
PMV BLD AUTO: 10 FL (ref 6–12)
POTASSIUM BLD-SCNC: 3.6 MMOL/L (ref 3.5–5.3)
RBC # BLD AUTO: 3.8 10*6/MM3 (ref 4.8–5.9)
SODIUM BLD-SCNC: 140 MMOL/L (ref 135–145)
TIBC SERPL-MCNC: 316 MCG/DL (ref 225–420)
TRIGL SERPL-MCNC: 94 MG/DL (ref 0–149)
TROPONIN I SERPL-MCNC: 0.01 NG/ML (ref 0–0.03)
TSH SERPL DL<=0.05 MIU/L-ACNC: 1.44 MIU/ML (ref 0.47–4.68)
VIT B12 BLD-MCNC: 289 PG/ML (ref 239–931)
WBC NRBC COR # BLD: 6.36 10*3/MM3 (ref 4.8–10.8)

## 2017-02-19 PROCEDURE — 80048 BASIC METABOLIC PNL TOTAL CA: CPT | Performed by: INTERNAL MEDICINE

## 2017-02-19 PROCEDURE — 25010000002 FUROSEMIDE PER 20 MG: Performed by: INTERNAL MEDICINE

## 2017-02-19 PROCEDURE — 84443 ASSAY THYROID STIM HORMONE: CPT | Performed by: INTERNAL MEDICINE

## 2017-02-19 PROCEDURE — 83540 ASSAY OF IRON: CPT | Performed by: INTERNAL MEDICINE

## 2017-02-19 PROCEDURE — 82728 ASSAY OF FERRITIN: CPT | Performed by: INTERNAL MEDICINE

## 2017-02-19 PROCEDURE — 82607 VITAMIN B-12: CPT | Performed by: INTERNAL MEDICINE

## 2017-02-19 PROCEDURE — 83550 IRON BINDING TEST: CPT | Performed by: INTERNAL MEDICINE

## 2017-02-19 PROCEDURE — 25010000002 ENOXAPARIN PER 10 MG: Performed by: INTERNAL MEDICINE

## 2017-02-19 PROCEDURE — 93306 TTE W/DOPPLER COMPLETE: CPT

## 2017-02-19 PROCEDURE — 84484 ASSAY OF TROPONIN QUANT: CPT | Performed by: INTERNAL MEDICINE

## 2017-02-19 PROCEDURE — 82746 ASSAY OF FOLIC ACID SERUM: CPT | Performed by: INTERNAL MEDICINE

## 2017-02-19 PROCEDURE — 85027 COMPLETE CBC AUTOMATED: CPT | Performed by: INTERNAL MEDICINE

## 2017-02-19 PROCEDURE — 93306 TTE W/DOPPLER COMPLETE: CPT | Performed by: INTERNAL MEDICINE

## 2017-02-19 PROCEDURE — 99222 1ST HOSP IP/OBS MODERATE 55: CPT | Performed by: PSYCHIATRY & NEUROLOGY

## 2017-02-19 PROCEDURE — 80061 LIPID PANEL: CPT | Performed by: INTERNAL MEDICINE

## 2017-02-19 RX ADMIN — FUROSEMIDE 40 MG: 10 INJECTION, SOLUTION INTRAMUSCULAR; INTRAVENOUS at 08:17

## 2017-02-19 RX ADMIN — AMANTADINE HYDROCHLORIDE 100 MG: 100 TABLET ORAL at 22:55

## 2017-02-19 RX ADMIN — AMANTADINE HYDROCHLORIDE 100 MG: 100 TABLET ORAL at 08:17

## 2017-02-19 RX ADMIN — ENOXAPARIN SODIUM 40 MG: 40 INJECTION SUBCUTANEOUS at 08:17

## 2017-02-19 RX ADMIN — CLOPIDOGREL BISULFATE 75 MG: 75 TABLET, FILM COATED ORAL at 17:41

## 2017-02-19 RX ADMIN — ASPIRIN 81 MG: 81 TABLET ORAL at 08:17

## 2017-02-19 RX ADMIN — LISINOPRIL 20 MG: 20 TABLET ORAL at 08:17

## 2017-02-19 RX ADMIN — CARBIDOPA AND LEVODOPA 1 TABLET: 25; 100 TABLET ORAL at 17:41

## 2017-02-19 RX ADMIN — METOPROLOL TARTRATE 50 MG: 50 TABLET ORAL at 22:53

## 2017-02-19 RX ADMIN — ACETAMINOPHEN 650 MG: 325 TABLET ORAL at 23:03

## 2017-02-19 RX ADMIN — FUROSEMIDE 40 MG: 10 INJECTION, SOLUTION INTRAMUSCULAR; INTRAVENOUS at 17:41

## 2017-02-19 RX ADMIN — MIRTAZAPINE 15 MG: 15 TABLET, FILM COATED ORAL at 22:55

## 2017-02-19 RX ADMIN — TAMSULOSIN HYDROCHLORIDE 0.4 MG: 0.4 CAPSULE ORAL at 22:54

## 2017-02-19 RX ADMIN — CARBIDOPA AND LEVODOPA 1 TABLET: 50; 200 TABLET, EXTENDED RELEASE ORAL at 22:54

## 2017-02-19 RX ADMIN — DONEPEZIL HYDROCHLORIDE 10 MG: 10 TABLET, FILM COATED ORAL at 22:53

## 2017-02-19 RX ADMIN — PRAVASTATIN SODIUM 20 MG: 20 TABLET ORAL at 22:53

## 2017-02-19 RX ADMIN — CARBIDOPA AND LEVODOPA 1 TABLET: 25; 100 TABLET ORAL at 08:17

## 2017-02-19 RX ADMIN — METOPROLOL TARTRATE 50 MG: 50 TABLET ORAL at 08:16

## 2017-02-19 NOTE — PROGRESS NOTES
[facility] Medicine Services  INPATIENT PROGRESS NOTE    Length of Stay: 1  Date of Admission: 2/18/2017  Primary Care Physician: John Paul Salvador MD    Subjective   Chief Complaint: increasing weakness with shortness of breath past 2-3 days  HPI   Presented to ED 2/18/17 with progressive weakness and immobility.  He has history of Parkinson's disease with worsening of his condition.  He also is in need of right knee surgery.  He is been unable to care for himself and caregiver is having difficulty caring for him.  He had bare metal stent 9/23/16 and is currently on Plavix.  BNP and d-dimer elevated on admission.    He is lying in bed.  He reports progressive weakness over the last 2-3 days.  He normally ambulates with cane.  However, he has been needing walker to assist with ambulation.  He does report shortness of breath the past 2 days.  He has right lower extremity edema.  He does have chronic edema due to needing right knee surgery.  He feels better this morning after receiving IV Lasix.  He indicates he is voiding large amounts.    Review of Systems   Constitutional: Positive for activity change (increased weakness past 3 days, needing walker for assistance). Negative for fever and unexpected weight change.   HENT: Negative for congestion.    Eyes: Negative for visual disturbance.   Respiratory: Positive for shortness of breath (×2 days). Negative for cough and wheezing.    Cardiovascular: Positive for leg swelling (Right lower extremity edema). Negative for chest pain and palpitations.   Gastrointestinal: Negative for abdominal distention.   Genitourinary: Negative for difficulty urinating.   Musculoskeletal: Positive for gait problem.   Skin: Negative for wound.   Neurological: Positive for weakness (Increasing weakness).   Psychiatric/Behavioral: Negative for agitation.      All pertinent negatives and positives are as above. All other systems have been reviewed and are  negative unless otherwise stated.     Objective    Temp:  [97 °F (36.1 °C)-97.8 °F (36.6 °C)] 97.2 °F (36.2 °C)  Heart Rate:  [69-81] 72  Resp:  [18-20] 20  BP: (131-164)/(56-85) 131/65  Physical Exam   Constitutional: He is oriented to person, place, and time. He appears well-developed and well-nourished.   HENT:   Head: Normocephalic and atraumatic.   Eyes: EOM are normal. Pupils are equal, round, and reactive to light.   Neck: Normal range of motion. Neck supple. No tracheal deviation present.   Cardiovascular: Normal rate, regular rhythm, normal heart sounds and intact distal pulses.    No murmur heard.  Pulmonary/Chest: Effort normal and breath sounds normal. He has no wheezes. He has no rales.   Abdominal: Soft. Bowel sounds are normal. He exhibits no distension.   Musculoskeletal: He exhibits edema (Right ankle and foot edema).   TEDs bilateral lower extremities   Neurological: He is alert and oriented to person, place, and time.   Skin: Skin is warm and dry. No rash noted.   Psychiatric: He has a normal mood and affect.     Results Review:  I have reviewed the labs, radiology results, and diagnostic studies.    Laboratory Data:     Results from last 7 days  Lab Units 02/19/17  0200 02/18/17  1629   WBC 10*3/mm3 6.36 8.48   HEMOGLOBIN g/dL 9.5* 10.3*   HEMATOCRIT % 31.4* 34.1*   PLATELETS 10*3/mm3 265 274          Results from last 7 days  Lab Units 02/19/17  0200 02/18/17  1629   SODIUM mmol/L 140 141   POTASSIUM mmol/L 3.6 4.2   CHLORIDE mmol/L 104 103   TOTAL CO2 mmol/L 26.0 28.0   BUN mg/dL 22* 28*   CREATININE mg/dL 0.90 0.99   CALCIUM mg/dL 9.3 10.0   BILIRUBIN mg/dL  --  0.6   ALK PHOS U/L  --  109   ALT (SGPT) U/L  --  18   AST (SGOT) U/L  --  20   GLUCOSE mg/dL 101* 96       Culture Data:      Radiology Data:   CTA chest with contrast 2/18/17 no pulmonary emboli some.  No acute lung disease.    X-ray 1 view 2/18/17 no acute disease    Scheduled Meds    amantadine 100 mg Oral Q12H   aspirin 81 mg Oral  Daily   carbidopa-levodopa 1 tablet Oral TID   carbidopa-levodopa CR 1 tablet Oral Nightly   clopidogrel 75 mg Oral Daily   donepezil 10 mg Oral Nightly   enoxaparin 40 mg Subcutaneous Daily   furosemide 40 mg Intravenous BID   levoFLOXacin 500 mg Intravenous Q24H   lisinopril 20 mg Oral Daily   metoprolol tartrate 50 mg Oral Q12H   mirtazapine 15 mg Oral Nightly   pravastatin 20 mg Oral Nightly   tamsulosin 0.4 mg Oral Nightly       I have reviewed the patient current medications.     Assessment/Plan     Hospital Problem List     Acute systolic congestive heart failure        Assessment:  1. Progressive weakness and fatigue likely related to advancing Parkinson's disease.   2. Parkinson's disease.   3. Coronary artery disease s/p bare metal stent 9/23/17 on plavix  4. Acute diastolic heart failure with elevated  BNP 2120, EF 54% 10/9/17   5. Hypertension.   6. Dyslipidemia.   7. Anemia  Iron deficiency. Iron 55, saturation 17%  8.  Abnormal UA trace leuk esterase, 3-5 WBCs, culture not indicated per microbiology lab.  9.  Elevated d-dimer with negative CTA for pulmonary emboli  10.  Elevated proBNP secondary to acute diastolic heart failure    Plan:  1.  Echocardiogram today to assess EF elevated proBNP  2.  Plavix 75 mg orally daily s/p bare metal stent 9/23/17  3.  Lasix IV 40 mg twice daily for diuresis.  4.  Home medications have been reviewed and resumed.  5.  Check BMP and CBC in a.m.  6.  Levaquin 500 mg IV started 2/18 for abnormal urinalysis.  Denies dysuria.  We will discontinue  7.  Physical therapy consultation  8.   consult for nursing home placement  9.  Telemetry, monitoring  10.  TEDs bilateral lower extremities.  Lovenox for DVT prophylaxis    The above documentation resulted from a face-to-face encounter by jocelin JEREZ, [facility].    Discharge Planning: I expect patient to be discharged to NH if bed offered.    MERI Bautista   02/19/17   9:49 AM    Chart reviewed  and patient examined.    Agree with assessment and plan.  Dr Pagan notes reviewed     Celena Patton DO  02/19/17  1:38 PM

## 2017-02-19 NOTE — DISCHARGE PLACEMENT REQUEST
"To:  Michele  From:  Carmen Benavides, BSW.  [phone].    Darrick Max (71 y.o. Male)     Date of Birth Social Security Number Address Home Phone MRN    [id number]  [id number]  [facility] [id number] [phone] [id number]    Oriental orthodox Marital Status          Seventh Day Yaz[employer] Single       Admission Date Admission Type Admitting Provider Attending Provider Department, Room/Bed    2/18/17 Emergency Celena Patton DO Horn, Frances Marie, DO [facility] 4B, 407/1    Discharge Date Discharge Disposition Discharge Destination                      Attending Provider: Celena Patton DO     Allergies:  Penicillins    Isolation:  None   Infection:  None   Code Status:  FULL    Ht:  72\" (182.9 cm)   Wt:  240 lb 2 oz (109 kg)    Admission Cmt:  None   Principal Problem:  None                Active Insurance as of 2/18/2017     Primary Coverage     Payor Plan Insurance Group Employer/Plan Group    MEDICARE MEDICARE A & B      Payor Plan Address Payor Plan Phone Number Effective From Effective To    [address] [phone] 9/1/2010     [place] [address]       Subscriber Name Subscriber Birth Date Member ID       DARRICK MAX 1945 [id number]           Secondary Coverage     Payor Plan Insurance Group Employer/Plan Group    [employer] [id number]     Payor Plan Address Payor Plan Phone Number Effective From Effective To    [address] [phone] 12/19/2014     [place] [address]       Subscriber Name Subscriber Birth Date Member ID       DARRICK MAX 1945 [id number]           Tertiary Coverage     Payor Plan Insurance Group Employer/Plan Group    MISC MED SUPP MISC MCARE SUPPLEMENT [id number]     Coverage Address Coverage Phone Number Effective From Effective To    [address] [facility] [phone] 9/7/2016     [place] [address]       Subscriber Name Subscriber Birth Date Member ID       DARRICK MAX 1945 [id number]           "       Emergency Contacts      (Rel.) Home Phone Work Phone Mobile Phone    Gloria Molina (Friend) [phone] -- --               History & Physical      H&P signed by Shiraz Horta MD at 2/19/2017  2:05 AM              cc:  TAYLOR PRITCHARD M.D.    DATE OF ADMISSION: 02/18/2017    TIME: 10:21 p.m.    PRIMARY CARE PHYSICIAN: Taylor Pritchard MD    NEUROLOGIST: Tyron Alberts MD    HISTORY OF PRESENT ILLNESS: Mr. Max is a 71-year-old  male who presents to [facility] due to a multiplicity of complaints including fatigue, malaise, lethargy, generalized weakness and immobility. Mr. Max has a history of Parkinson's disease with recent worsening of his condition. He is no longer able to care for himself. He presents now requesting evaluation and treatment of his worsening Parkinson's disease. Of note, he is seen by Dr. Tyron Alberts of the neurology service.     REVIEW OF SYSTEMS: Otherwise unremarkable from a cardiovascular, pulmonary, gastrointestinal, genitourinary, neurologic, psychiatric, metabolic and constitutional standpoint except as noted. He has had generalized fatigue and weakness. He has had no definite fevers, chills, or sweats. His appetite is good. His weight is stable. He has had no chest pain, chest palpitations, shortness of breath, lower extremity swelling, orthopnea, cough, wheezing, or hemoptysis. He has had no abdominal pain, nausea, vomiting, diarrhea, or constipation. He has had no dysphagia, odynophagia, hematemesis, hematochezia, or melena. He has had no flank pain, pelvic pain, hematuria, or dysuria. He has had no skin rashes or arthralgias or myalgias. He has had no headache, confusion, memory deficits or loss of consciousness. He has had no changes in his vision or hearing. He has had no acute motor or sensory deficits except as noted above. He is unable to ambulate independently.     PAST MEDICAL HISTORY:  1. Hypertension.   2. Dyslipidemia.   3.  Coronary artery disease.   4. Parkinson's disease.   5. Enlarged prostate gland.   6. Osteoarthritis of the knees.     PAST SURGICAL HISTORY:  1. Status post multiple cardiac stent deployments. He had a drug-eluting stent placed in 2016.   2. Status post tonsillectomy.     ALLERGIES: PENICILLIN.     HOME MEDICATIONS:  1. These are uncertain. Medical record indicates he uses amantadine 100 mg p.o. b.i.d.   2. Aspirin 81 mg p.o. daily.   3. Glucosamine 1500 mg p.o. daily.   4. Sinemet 25/100, take 1 p.o. t.i.d. and 50/200, take 1 p.o. at bedtime.   5. Klonopin 0.5 mg p.o. at bedtime.   6. Plavix 75 mg p.o. daily.   7. Coenzyme Q 100 mg p.o. daily.   8. Colace 250 mg p.o. daily.   9. Aricept 10 mg p.o. at bedtime.   10. Lasix 20 mg p.o. daily.   11. Lisinopril 20 mg p.o. daily.   12. Krill oil 1 p.o. daily.   13. Lopressor 50 mg p.o. b.i.d.   14. Myrbetriq 50 mg p.o. daily.   15. Remeron 15 mg p.o. at bedtime.   16. Nitroglycerin sublingual tablets 0.4 mg p.o. p.r.n. for chest pain.   17. Pravachol 40 mg p.o. at bedtime.   18. Flomax 0.4 mg p.o. at bedtime.     SOCIAL HISTORY: Significant for being a resident of [place], [place]. He lives with his ex-wife. He is . He has a son and daughter in good health. He is disabled due to Parkinson's disease. He has a high school education. He has no history of tobacco, alcohol or drug use. He is a Seventh-day Hoahaoism. He has no recent history of travel outside this region.     He designates his son, Darrick Max JR, to serve as a surrogate for healthcare matters should such become necessary. His son is also POWER OF .     The patient is a FULL CODE.     FAMILY HISTORY: Significant for having a brother  due to cancer of uncertain type. He has 3 elderly surviving sisters described as being in fair health, although he is not able to elaborate. Both parents are  due to natural causes of uncertain type.     PHYSICAL EXAMINATION:    VITAL  SIGNS: Temperature is 97, pulse 80, respirations are 18 and unlabored, blood pressure is 140/62, O2 saturation is 96% breathing ambient air. Weight is 240 pounds.     GENERAL: This is a 71-year-old  male appearing younger than his documented age. He is resting comfortably in bed. He is in no apparent distress. He is interactive and cooperative. He proves to be a fairly good historian.     HEAD AND NECK: Essentially unremarkable except as noted. I see no signs of acute trauma. Eyes, nose, and throat are grossly unremarkable. Sclerae are clear. There is no discharge from the nostrils. Mucous membranes are moist.     NECK: Supple. He has no cervical or clavicular adenopathy. He has no carotid bruits. There are no masses of the head or neck. Neck veins do not appear pathologically distended.     CARDIAC: Reveals S1 and S2 with a regular rhythm. He has no murmurs, rubs, or gallops.     LUNGS: Reveals bilateral breath sounds are clear to auscultation throughout. He has no rales, wheezes, or rhonchi.     ABDOMEN: Reveals bowel sounds to be present. His abdomen is nontender, nondistended and soft. He is obese.     EXTREMITIES: No lower extremity edema, erythema or calf tenderness.     NEUROLOGIC: Reveals the patient to be awake and alert. He seems oriented to person, place, time and situation. Cranial nerves II-XII are grossly intact. He exhibits no definite acute focal motor or sensory deficits. He exhibits no bradykinesia. He has cogwheeling of the upper extremities. He is able to stand with assistance, but is unable to ambulate.     PSYCHIATRIC: Reveals his mood to be stable. Affect seems appropriate. Thought processes are organized in that he is able to answer questions appropriately and provide a coherent history. Speech is fluent, but measured and slow. There is no flight of ideas. There are no obvious short-term or long-term memory deficits. There or no obvious detectable short-term or long-term memory  deficits.     DIAGNOSTIC DATA: Complete metabolic panel is essentially unremarkable, except for BUN of 28.     CBC demonstrates a white blood cell count of 8.48, hemoglobin 10.3, hematocrit 34.1, platelet count of 274,000.     Prothrombin time and PTT are unremarkable.     D-dimer is 3.59.     Troponin level is unremarkable.     Magnesium level is 1.9.     Urinalysis demonstrates specific gravity of 1.010 with a pH of 6.0. His urine contains a trace amount of leukocyte esterase and 3-5 white blood cells per high-power field.     ProBNP is 2120.     Chest x-ray demonstrates no acute abnormalities.     CT of the chest demonstrates no pulmonary embolism or other acute disease.     EKG demonstrates sinus rhythm of 81 beats.     IMPRESSION:  1. Progressive weakness and fatigue likely related to advancing Parkinson's disease.   2. Parkinson's disease.   3. Coronary artery disease.   4. Acute congestive heart failure due to diastolic dysfunction.   5. Hypertension.   6. Dyslipidemia.   7. Anemia.   8. Urinary tract infection.    PLAN: At this time, Mr. Max will be admitted to [facility] for further evaluation and treatment. His admitting diagnoses are as noted. His condition at this time is judged to be stable. He will be placed on telemetry.     I have asked the nursing staff to obtain vital signs per protocol. He will be confined to bedrest. His allergy to PENICILLIN is noted. I have asked the nursing staff to monitor input and output. Daily weights will be obtained. He will be maintained on a regular diet. IV fluids will be saline locked. Oxygen will be used as needed to maintain his O2 saturation greater than 92%. He is a FULL CODE. Fall precautions are to be instituted.     I will consult the neurology service.     INITIAL ADMITTING MEDICATIONS:   1. Amantadine 100 mg p.o. b.i.d.   2. Aspirin 81 mg p.o. daily.   3. Sinemet 25/100 p.o. t.i.d. and 500/200, take 1 p.o. at bedtime.   4. Plavix 75 mg p.o.  daily.   5. Aricept 10 mg p.o. at bedtime.   6. Lovenox 40 mg subcutaneous daily.   7. Lasix 40 mg IV b.i.d.   8. Lisinopril 20 mg p.o. daily.   9. Metoprolol 50 mg p.o. q.12 h.   10. Remeron 15 mg p.o. at bedtime.   11. Pravachol 20 mg p.o. at bedtime.   12. Flomax 0.4 mg p.o. at bedtime.   13. Levaquin 500 mg IV daily.   14. Tylenol 650 mg p.o. q.6 h. p.r.n. for fever and/or discomfort.   15. DuoNeb 1 unit q.4 h. p.r.n. for shortness of breath.   16. Nitroglycerin sublingual tablets 0.4 mg p.o. p.r.n. for chest pain.   17. Zofran 4 mg IV q.6 h. p.r.n. for nausea and vomiting.     I will obtain followup laboratory studies and a cardiac echo in the morning.      I will continue to follow Mr. Max closely through the night pending return of the hospitalist team in the morning. The nursing staff may call should they have any questions or concerns. Please refer to the medical record for additional information, orders and/or comments.        Shiraz Horta M.D.  SHARONA/[id number]  D:  02/18/2017 23:35:11(Eastern Time)  T:  02/19/2017 00:18:22(Eastern Time)  Voice ID:  [id number]/Document ID:  [id number]       Electronically signed by Shiraz Horta MD at 2/19/2017  2:05 AM        Prior to Admission Medications     Prescriptions Last Dose Informant Patient Reported? Taking?    amantadine (SYMMETREL) 100 MG capsule   No Yes    Take 1 capsule by mouth 2 (Two) Times a Day.    aspirin 81 MG EC tablet  Self Yes Yes    Take 81 mg by mouth Daily.    Boswellia-Glucosamine-Vit D (GLUCOSAMINE COMPLEX PO)   Yes Yes    Take 1,500 mg by mouth daily with breakfast.    carbidopa-levodopa (SINEMET)  MG per tablet   Yes Yes    Take 1 tablet by mouth 3 (three) times a day.    carbidopa-levodopa CR (SINEMET CR)  MG per CR tablet   Yes Yes    Take 1 tablet by mouth Every Night.    clonazePAM (KlonoPIN) 0.5 MG tablet   No Yes    Take 1 tablet by mouth every night.    clopidogrel (PLAVIX) 75 MG tablet   No Yes    Take 1 tablet by mouth  daily.    coenzyme Q10 100 MG capsule   Yes Yes    Take 100 mg by mouth daily.    docusate sodium (COLACE) 250 MG capsule   Yes Yes    Take 250 mg by mouth daily.    donepezil (ARICEPT) 10 MG tablet   Yes Yes    Take 10 mg by mouth every night.    furosemide (LASIX) 20 MG tablet   Yes Yes    Take 20 mg by mouth Daily.    lisinopril (PRINIVIL,ZESTRIL) 40 MG tablet   Yes Yes    Take 20 mg by mouth Daily.    MEGARED OMEGA-3 KRILL OIL PO   Yes Yes    Take  by mouth every night.    metoprolol tartrate (LOPRESSOR) 50 MG tablet   Yes Yes    Take 50 mg by mouth 2 (two) times a day.    Mirabegron ER (MYRBETRIQ) 50 MG tablet sustained-release 24 hour   No Yes    Take 50 mg by mouth Daily.    mirtazapine (REMERON) 30 MG tablet   Yes Yes    Take 15 mg by mouth Every Night. Takes half nightly    nitroglycerin (NITROSTAT) 0.4 MG SL tablet   No Yes    Place 1 tablet under the tongue every 5 (five) minutes as needed for chest pain. Take no more than 3 doses in 15 minutes.    pravastatin (PRAVACHOL) 40 MG tablet   Yes Yes    Take 20 mg by mouth every night.    tamsulosin (FLOMAX) 0.4 MG capsule 24 hr capsule   Yes Yes    Take 1 capsule by mouth Every Night.          Hospital Medications (active)       Dose Frequency Start End    acetaminophen (TYLENOL) tablet 650 mg 650 mg Every 6 Hours PRN 2/18/2017     Sig - Route: Take 2 tablets by mouth Every 6 (Six) Hours As Needed for mild pain (1-3) or fever. - Oral    amantadine (SYMMETREL) tablet 100 mg 100 mg Every 12 Hours Scheduled 2/18/2017     Sig - Route: Take 1 tablet by mouth Every 12 (Twelve) Hours. - Oral    aspirin EC tablet 81 mg 81 mg Daily 2/19/2017     Sig - Route: Take 1 tablet by mouth Daily. - Oral    carbidopa-levodopa (SINEMET)  MG per tablet 1 tablet 1 tablet 3 Times Daily 2/19/2017     Sig - Route: Take 1 tablet by mouth 3 (Three) Times a Day. - Oral    carbidopa-levodopa CR (SINEMET CR)  MG per CR tablet 1 tablet 1 tablet Nightly 2/18/2017     Sig - Route:  Take 1 tablet by mouth Every Night. - Oral    clopidogrel (PLAVIX) tablet 75 mg 75 mg Daily 2/19/2017     Sig - Route: Take 1 tablet by mouth Daily. - Oral    donepezil (ARICEPT) tablet 10 mg 10 mg Nightly 2/18/2017     Sig - Route: Take 1 tablet by mouth Every Night. - Oral    enoxaparin (LOVENOX) syringe 40 mg 40 mg Daily 2/19/2017     Sig - Route: Inject 0.4 mL under the skin Daily. - Subcutaneous    furosemide (LASIX) injection 40 mg 40 mg Once 2/18/2017 2/18/2017    Sig - Route: Infuse 4 mL into a venous catheter 1 (One) Time. - Intravenous    furosemide (LASIX) injection 40 mg 40 mg 2 Times Daily 2/19/2017     Sig - Route: Infuse 4 mL into a venous catheter 2 (Two) Times a Day. - Intravenous    iopamidol (ISOVUE-370) 76 % injection 150 mL 150 mL Once in Imaging 2/18/2017 2/18/2017    Sig - Route: Infuse 150 mL into a venous catheter Once. - Intravenous    ipratropium-albuterol (DUO-NEB) nebulizer solution 3 mL 3 mL Every 4 Hours PRN 2/18/2017     Sig - Route: Take 3 mL by nebulization Every 4 (Four) Hours As Needed for wheezing or shortness of air. - Nebulization    levoFLOXacin (LEVAQUIN) 500 mg/100 mL D5W (premix) (LEVAQUIN) 500 mg 500 mg Every 24 Hours 2/18/2017     Sig - Route: Infuse 100 mL into a venous catheter Daily. - Intravenous    lisinopril (PRINIVIL,ZESTRIL) tablet 20 mg 20 mg Daily 2/19/2017     Sig - Route: Take 1 tablet by mouth Daily. - Oral    metoprolol tartrate (LOPRESSOR) tablet 50 mg 50 mg Every 12 Hours Scheduled 2/18/2017     Sig - Route: Take 1 tablet by mouth Every 12 (Twelve) Hours. - Oral    mirtazapine (REMERON) tablet 15 mg 15 mg Nightly 2/18/2017     Sig - Route: Take 1 tablet by mouth Every Night. - Oral    nitroglycerin (NITROSTAT) SL tablet 0.4 mg 0.4 mg Every 5 Minutes PRN 2/18/2017     Sig - Route: Place 1 tablet under the tongue Every 5 (Five) Minutes As Needed for chest pain. - Sublingual    ondansetron (ZOFRAN) injection 4 mg 4 mg Every 6 Hours PRN 2/18/2017     Sig -  "Route: Infuse 2 mL into a venous catheter Every 6 (Six) Hours As Needed for nausea or vomiting. - Intravenous    pravastatin (PRAVACHOL) tablet 20 mg 20 mg Nightly 2/18/2017     Sig - Route: Take 1 tablet by mouth Every Night. - Oral    sodium chloride 0.9 % flush 1-10 mL 1-10 mL As Needed 2/18/2017     Sig - Route: Infuse 1-10 mL into a venous catheter As Needed for line care. - Intravenous    sodium chloride 0.9 % flush 10 mL 10 mL As Needed 2/18/2017     Sig - Route: Infuse 10 mL into a venous catheter As Needed for line care. - Intravenous    Linked Group 1:  \"And\" Linked Group Details        tamsulosin (FLOMAX) 24 hr capsule 0.4 mg 0.4 mg Nightly 2/18/2017     Sig - Route: Take 1 capsule by mouth Every Night. - Oral    carbidopa-levodopa (SINEMET)  MG per tablet 1 tablet (Discontinued) 1 tablet Every 8 Hours Scheduled 2/18/2017 2/18/2017    Sig - Route: Take 1 tablet by mouth Every 8 (Eight) Hours. - Oral    nitroglycerin (NITROSTAT) SL tablet 0.4 mg (Discontinued) 0.4 mg Every 5 Minutes PRN 2/18/2017 2/18/2017    Sig - Route: Place 1 tablet under the tongue Every 5 (Five) Minutes As Needed for chest pain. - Sublingual    Reason for Discontinue: Duplicate order          Physician Progress Notes (most recent note)     No notes of this type exist for this encounter.        Consult Notes (most recent note)     No notes of this type exist for this encounter.        "

## 2017-02-19 NOTE — CONSULTS
Neurology Consult Note    Referring Provider: Dr. Shiraz Horta  Reason for Consultation: Parkinson's Disease      History of present illness:    This is a 71-year-old male with known end-stage Parkinson's disease, REM behavioral disorder, and potentially dementia as well.  Reading through previous notes the nurse practitioner at the neurology clinic as recommended multiple times that he consider the big and loud program as well as physical therapy programs.  Each time he was not amenable to trying these.  He is currently admitted for an unclear reason.  Speaking to him today he has a wide constellation of generalized complaints.  He tells me that he has had malaise for the past several months.  In addition to this all the joints in his body or hurting.  In addition to this he feels as if his ambulation has decreased over the past month or 2.  He denies tremor.  Instead he is suggestive of that he has difficulties ambulating even short distances secondary to bradykinesia this.  He utilizes his ex-wife is a caretaker at home.  Judging from the documentation the chart sounds as if that [facility] and placement in a local nursing facility.  When I speak to him further about his difficulties with ambulation, he suggests that it is often due to the fact that his feet are swollen in the morning.    Past Medical History   Diagnosis Date   • Acute systolic congestive heart failure 2/18/2017   • Arrhythmia    • Arthritis of knee, right    • BPH (benign prostatic hypertrophy)    • CAD (coronary artery disease)    • Hyperlipidemia    • Hypertension    • Parkinson disease    • Parkinson's disease        Allergies   Allergen Reactions   • Penicillins      No current facility-administered medications on file prior to encounter.      Current Outpatient Prescriptions on File Prior to Encounter   Medication Sig   • amantadine (SYMMETREL) 100 MG capsule Take 1 capsule by mouth 2  (Two) Times a Day.   • aspirin 81 MG EC tablet Take 81 mg by mouth Daily.   • Boswellia-Glucosamine-Vit D (GLUCOSAMINE COMPLEX PO) Take 1,500 mg by mouth daily with breakfast.   • carbidopa-levodopa (SINEMET)  MG per tablet Take 1 tablet by mouth 3 (three) times a day.   • carbidopa-levodopa CR (SINEMET CR)  MG per CR tablet Take 1 tablet by mouth Every Night.   • clonazePAM (KlonoPIN) 0.5 MG tablet Take 1 tablet by mouth every night.   • clopidogrel (PLAVIX) 75 MG tablet Take 1 tablet by mouth daily.   • coenzyme Q10 100 MG capsule Take 100 mg by mouth daily.   • docusate sodium (COLACE) 250 MG capsule Take 250 mg by mouth daily.   • donepezil (ARICEPT) 10 MG tablet Take 10 mg by mouth every night.   • furosemide (LASIX) 20 MG tablet Take 20 mg by mouth Daily.   • lisinopril (PRINIVIL,ZESTRIL) 40 MG tablet Take 20 mg by mouth Daily.   • MEGARED OMEGA-3 KRILL OIL PO Take  by mouth every night.   • metoprolol tartrate (LOPRESSOR) 50 MG tablet Take 50 mg by mouth 2 (two) times a day.   • Mirabegron ER (MYRBETRIQ) 50 MG tablet sustained-release 24 hour Take 50 mg by mouth Daily.   • mirtazapine (REMERON) 30 MG tablet Take 15 mg by mouth Every Night. Takes half nightly   • nitroglycerin (NITROSTAT) 0.4 MG SL tablet Place 1 tablet under the tongue every 5 (five) minutes as needed for chest pain. Take no more than 3 doses in 15 minutes.   • pravastatin (PRAVACHOL) 40 MG tablet Take 20 mg by mouth every night.   • tamsulosin (FLOMAX) 0.4 MG capsule 24 hr capsule Take 1 capsule by mouth Every Night.       Social History     Social History   • Marital status: Single     Spouse name: N/A   • Number of children: N/A   • Years of education: N/A     Occupational History   • Not on file.     Social History Main Topics   • Smoking status: Never Smoker   • Smokeless tobacco: Never Used   • Alcohol use No   • Drug use: No   • Sexual activity: Defer     Other Topics Concern   • Not on file     Social History Narrative     PATIENT HAS ONLY LIVED IN THIS AREA FOR 7-8 MONTHS.  HE IS FROM [place].  SAYS HE MISSES HIS HOME STATE.  HIS MALE COUSIN IS HIS NEXT OF KIN     Family History   Problem Relation Age of Onset   • Hypertension Mother    • Alzheimer's disease Mother    • Diabetes Father        Review of Systems  A 14 point review of systems was reviewed and was negative except for bradycardiakinesia    Vital Signs   Temp:  [97 °F (36.1 °C)-97.8 °F (36.6 °C)] 97.2 °F (36.2 °C)  Heart Rate:  [69-81] 72  Resp:  [18-20] 20  BP: (131-164)/(56-85) 131/65    General Exam:  Head:  Normal cephalic, atraumatic  HEENT:  Neck supple  Fundoscopic Exam:  No signs of disc edema  CVS:  Regular rate and rhythm.  No murmurs  Carotid Examination:  No bruits  Lungs:  Clear to auscultation  Abdomen:  Non-tender, Non-distended  Extremities:  Edema in lower extremities  Skin:  No rashes    Neurologic Exam:    Mental Status:    -Awake, Alert, Oriented X 3  -No word finding difficulties  -No aphasia  -Hypophonia  CN II:  Visual fields full.  Pupils equally reactive to light  CN III, IV, VI:  Extraocular Muscles full with no signs of nystagmus  CN V: Masked face  CN VII:  Facial motor symmetric  CN VIII:  Gross hearing intact bilaterally  CN IX:  Palate elevates symmetrically  CN X:  Palate elevates symmetrically  CN XI:  Shoulder shrug symmetric  CN XII:  Tongue is midline on protrusion    Motor: (strength out of 5:  1= minimal movement, 2 = movement in plane of gravity, 3 = movement against gravity, 4 = movement against some resistance, 5 = full strength)    -Right Upper Ext: Proximal: 5 Distal: 5  -Left Upper Ext: Proximal: 5 Distal: 5    -Right Lower Ext: Proximal: 5 Distal: 5  -Left Lower Ext: Proximal: 5 Distal: 5    No signs of increased tone currently    DTR:  1+ throughout in all four extremities    Sensory:  -Intact to light touch, pinprick, temperature, pain, and proprioception    Coordination:  -No signs of resting tremor    Gait  -patient  refused      Results Review:  Lab Results (last 24 hours)     Procedure Component Value Units Date/Time    CBC & Differential [[id number]] Collected:  02/18/17 1629    Specimen:  Blood Updated:  02/18/17 1647    Narrative:       The following orders were created for panel order CBC & Differential.  Procedure                               Abnormality         Status                     ---------                               -----------         ------                     CBC Auto Differential[[id number]]         Abnormal            Final result                 Please view results for these tests on the individual orders.    CBC Auto Differential [[id number]]  (Abnormal) Collected:  02/18/17 1629    Specimen:  Blood from Arm, Left Updated:  02/18/17 1647     WBC 8.48 10*3/mm3      RBC 4.05 (L) 10*6/mm3      Hemoglobin 10.3 (L) g/dL      Hematocrit 34.1 (L) %      MCV 84.2 fL      MCH 25.4 (L) pg      MCHC 30.2 (L) g/dL      RDW 13.2 %      RDW-SD 40.1 fl      MPV 10.0 fL      Platelets 274 10*3/mm3      Neutrophil % 75.1 %      Lymphocyte % 14.3 (L) %      Monocyte % 9.2 %      Eosinophil % 0.8 %      Basophil % 0.2 %      Immature Grans % 0.4 %      Neutrophils, Absolute 6.37 10*3/mm3      Lymphocytes, Absolute 1.21 10*3/mm3      Monocytes, Absolute 0.78 10*3/mm3      Eosinophils, Absolute 0.07 10*3/mm3      Basophils, Absolute 0.02 10*3/mm3      Immature Grans, Absolute 0.03 10*3/mm3     Lactic Acid, Plasma [[id number]]  (Normal) Collected:  02/18/17 1629    Specimen:  Blood from Arm, Left Updated:  02/18/17 1650     Lactate 0.9 mmol/L     Comprehensive Metabolic Panel [[id number]]  (Abnormal) Collected:  02/18/17 1629    Specimen:  Blood from Arm, Left Updated:  02/18/17 1653     Glucose 96 mg/dL      BUN 28 (H) mg/dL      Creatinine 0.99 mg/dL      Sodium 141 mmol/L      Potassium 4.2 mmol/L      Chloride 103 mmol/L      CO2 28.0 mmol/L      Calcium 10.0 mg/dL      Total Protein 7.4 g/dL      Albumin 4.10 g/dL      ALT  (SGPT) 18 U/L      AST (SGOT) 20 U/L      Alkaline Phosphatase 109 U/L      Total Bilirubin 0.6 mg/dL      eGFR Non African Amer 75 mL/min/1.73      Globulin 3.3 gm/dL      A/G Ratio 1.2 g/dL      BUN/Creatinine Ratio 28.3 (H)      Anion Gap 10.0 mmol/L     Narrative:       The MDRD GFR formula is only valid for adults with stable renal function between ages 18 and 70.    D-dimer, Quantitative [[id number]]  (Abnormal) Collected:  02/18/17 1629    Specimen:  Blood from Arm, Left Updated:  02/18/17 1659     D-Dimer, Quantitative 3.59 (H) mg/L (FEU)     Narrative:       Reference Range is 0-0.50 mg/L FEU. However, results <0.50 mg/L FEU tends to rule out DVT or PE. Results >0.50 mg/L FEU are not useful in predicting absence or presence of DVT or PE.    aPTT [[id number]]  (Normal) Collected:  02/18/17 1629    Specimen:  Blood from Arm, Left Updated:  02/18/17 1659     PTT 29.2 seconds     Protime-INR [[id number]]  (Normal) Collected:  02/18/17 1629    Specimen:  Blood from Arm, Left Updated:  02/18/17 1659     Protime 13.2 Seconds      INR 0.97     BNP [[id number]]  (Abnormal) Collected:  02/18/17 1629    Specimen:  Blood from Arm, Left Updated:  02/18/17 1700     proBNP 2120.0 (H) pg/mL     POC Troponin, Rapid [[id number]]  (Normal) Collected:  02/18/17 1659    Specimen:  Blood Updated:  02/18/17 1710     Troponin I 0.00 ng/mL       Serial Number: [id number]    : [id number]       Urinalysis With / Culture If Indicated [[id number]]  (Abnormal) Collected:  02/18/17 2040    Specimen:  Urine from Urine, Clean Catch Updated:  02/18/17 2056     Color, UA Yellow      Appearance, UA Clear      pH, UA 6.0      Specific Gravity, UA 1.010      Glucose, UA Negative      Ketones, UA Negative      Bilirubin, UA Negative      Blood, UA Negative      Protein, UA Negative      Leuk Esterase, UA Trace (A)      Nitrite, UA Negative      Urobilinogen, UA 1.0 E.U./dL     Urinalysis, Microscopic Only [[id number]]  (Abnormal) Collected:  02/18/17 2040     Specimen:  Urine from Urine, Clean Catch Updated:  02/18/17 2056     RBC, UA None Seen /HPF      WBC, UA 3-5 (A) /HPF      Bacteria, UA Trace (A) /HPF      Squamous Epithelial Cells, UA None Seen /HPF      Hyaline Casts, UA None Seen /LPF      Methodology Automated Microscopy     Magnesium [[id number]]  (Normal) Collected:  02/18/17 2127    Specimen:  Blood Updated:  02/18/17 2145     Magnesium 1.9 mg/dL     Troponin [[id number]]  (Normal) Collected:  02/18/17 2127    Specimen:  Blood Updated:  02/18/17 2157     Troponin I <0.012 ng/mL     CBC (No Diff) [[id number]]  (Abnormal) Collected:  02/19/17 0200    Specimen:  Blood Updated:  02/19/17 0217     WBC 6.36 10*3/mm3      RBC 3.80 (L) 10*6/mm3      Hemoglobin 9.5 (L) g/dL      Hematocrit 31.4 (L) %      MCV 82.6 fL      MCH 25.0 (L) pg      MCHC 30.3 (L) g/dL      RDW 13.3 %      RDW-SD 40.3 fl      MPV 10.0 fL      Platelets 265 10*3/mm3     Basic Metabolic Panel [[id number]]  (Abnormal) Collected:  02/19/17 0200    Specimen:  Blood Updated:  02/19/17 0230     Glucose 101 (H) mg/dL      BUN 22 (H) mg/dL      Creatinine 0.90 mg/dL      Sodium 140 mmol/L      Potassium 3.6 mmol/L      Chloride 104 mmol/L      CO2 26.0 mmol/L      Calcium 9.3 mg/dL      eGFR Non African Amer 83 mL/min/1.73      BUN/Creatinine Ratio 24.4      Anion Gap 10.0 mmol/L     Narrative:       The MDRD GFR formula is only valid for adults with stable renal function between ages 18 and 70.    Troponin [[id number]]  (Normal) Collected:  02/19/17 0200    Specimen:  Blood Updated:  02/19/17 0240     Troponin I 0.015 ng/mL     Lipid Panel [[id number]]  (Abnormal) Collected:  02/19/17 0200    Specimen:  Blood Updated:  02/19/17 0241     Total Cholesterol 98 (L) mg/dL      Triglycerides 94 mg/dL      HDL Cholesterol 31 (L) mg/dL      LDL Cholesterol  45 mg/dL      LDL/HDL Ratio 1.55     Iron Profile [[id number]]  (Abnormal) Collected:  02/19/17 0201    Specimen:  Blood Updated:  02/19/17 0245     Iron 55 mcg/dL       TIBC 316 mcg/dL      Iron Saturation 17 (L) %     TSH [[id number]]  (Normal) Collected:  02/19/17 0201    Specimen:  Blood Updated:  02/19/17 0304     TSH 1.440 mIU/mL     Ferritin [[id number]]  (Abnormal) Collected:  02/19/17 0201    Specimen:  Blood Updated:  02/19/17 0308     Ferritin 16.40 (L) ng/mL     Vitamin B12 [[id number]]  (Normal) Collected:  02/19/17 0201    Specimen:  Blood Updated:  02/19/17 0338     Vitamin B-12 289 pg/mL     Folate [[id number]] Collected:  02/19/17 0201    Specimen:  Blood Updated:  02/19/17 0338     Folate 6.69 ng/mL           .  Imaging Results (last 24 hours)     Procedure Component Value Units Date/Time    XR Chest 1 View [[id number]] Collected:  02/18/17 1712     Updated:  02/18/17 1714    Narrative:       EXAMINATION: XR CHEST 1 VW-     2/18/2017 4:20 PM CST     HISTORY: SOB     1 view chest x-ray compared with 10/07/2016.     The heart and mediastinum are within normal limits.      The lungs are adequately expanded with no pneumonia, pneumothorax, or  active failure.    Scattered calcified granulomas.     Summary:  No acute disease.        This report was finalized on 02/18/2017 17:12 by Dr. Kali Jacob MD.    CT Angiogram Chest With Contrast [[id number]] Collected:  02/18/17 1910     Updated:  02/18/17 1913    Narrative:       EXAMINATION: CT ANGIOGRAM CHEST W CONTRAST-      2/18/2017 6:53 PM CST     HISTORY: SOB, elevated dimer     In order to have a CT radiation dose as low as reasonably achievable  Automated Exposure Control was utilized for adjustment of the mA and/or  KV according to patient size.     DLP in mGycm= 530.     CT angiography protocol.   CT imaging with bolus IV contrast injection.   Under concurrent supervision axial, sagittal, coronal, and  three-dimensional data sets were constructed.     Mild cardiomegaly.  No thoracic aortic aneurysm or dissection.     Symmetric pulmonary arteries. No pulmonary embolism.     Poorly expanded lungs with a few calcified  granulomas. No pneumonia,  pneumothorax, or pleural effusion.     Summary:  1. No pulmonary embolism.  2. No acute lung disease.                             This report was finalized on 02/18/2017 19:11 by Dr. Kali Jacob MD.              Impression    1.  Parkinson's Disease  2.  Dementia  3.  Failure to thrive    Discussion: Looking at Mr. Max's overall clinical picture I think it is much more consistent with a failure to thrive syndrome.  While he insists that he has overall medication compliance it is my belief that he has failed to continue with any sort of therapy and has decreased his activity level significantly over the past 6 months despite recommendations by multiple medical entities that he needed to increase it.  Parkinson's disease even when maximally medically managed we will worsened drastically without continued movement and I believe that is the mainstay of Mr. Max's issue currently.  His neurologic exam currently shows no signs of tremors and no signs of tone suggesting that he is optimally managed with his dopaminergic therapy.  In regards to his gait he is on amantadine but once again this will not take the place of physical therapy and continued movement.  He may have a slight urinary tract infection which may worsen things somewhat but I believe that is a small component of this.  I am going to recommend physical and occupational therapy and in all reality wherever he is discharged to the studies to be the mainstay of his plan.      Plan    PT/OT    I discussed the patients findings and my recommendations with patient    Ortega Alberts MD  02/19/17  10:55 AM

## 2017-02-19 NOTE — H&P
cc:  JOHN PAUL SALVADOR M.D.    DATE OF ADMISSION: 02/18/2017    TIME: 10:21 p.m.    PRIMARY CARE PHYSICIAN: John Paul Salvador MD    NEUROLOGIST: Tyron Alberts MD    HISTORY OF PRESENT ILLNESS: Mr. Max is a 71-year-old  male who presents to [facility] due to a multiplicity of complaints including fatigue, malaise, lethargy, generalized weakness and immobility. Mr. Max has a history of Parkinson's disease with recent worsening of his condition. He is no longer able to care for himself. He presents now requesting evaluation and treatment of his worsening Parkinson's disease. Of note, he is seen by Dr. Tyron Alberts of the neurology service.     REVIEW OF SYSTEMS: Otherwise unremarkable from a cardiovascular, pulmonary, gastrointestinal, genitourinary, neurologic, psychiatric, metabolic and constitutional standpoint except as noted. He has had generalized fatigue and weakness. He has had no definite fevers, chills, or sweats. His appetite is good. His weight is stable. He has had no chest pain, chest palpitations, shortness of breath, lower extremity swelling, orthopnea, cough, wheezing, or hemoptysis. He has had no abdominal pain, nausea, vomiting, diarrhea, or constipation. He has had no dysphagia, odynophagia, hematemesis, hematochezia, or melena. He has had no flank pain, pelvic pain, hematuria, or dysuria. He has had no skin rashes or arthralgias or myalgias. He has had no headache, confusion, memory deficits or loss of consciousness. He has had no changes in his vision or hearing. He has had no acute motor or sensory deficits except as noted above. He is unable to ambulate independently.     PAST MEDICAL HISTORY:  1. Hypertension.   2. Dyslipidemia.   3. Coronary artery disease.   4. Parkinson's disease.   5. Enlarged prostate gland.   6. Osteoarthritis of the knees.     PAST SURGICAL HISTORY:  1. Status post multiple cardiac stent deployments. He had a drug-eluting stent placed in  2016.   2. Status post tonsillectomy.     ALLERGIES: PENICILLIN.     HOME MEDICATIONS:  1. These are uncertain. Medical record indicates he uses amantadine 100 mg p.o. b.i.d.   2. Aspirin 81 mg p.o. daily.   3. Glucosamine 1500 mg p.o. daily.   4. Sinemet 25/100, take 1 p.o. t.i.d. and 50/200, take 1 p.o. at bedtime.   5. Klonopin 0.5 mg p.o. at bedtime.   6. Plavix 75 mg p.o. daily.   7. Coenzyme Q 100 mg p.o. daily.   8. Colace 250 mg p.o. daily.   9. Aricept 10 mg p.o. at bedtime.   10. Lasix 20 mg p.o. daily.   11. Lisinopril 20 mg p.o. daily.   12. Krill oil 1 p.o. daily.   13. Lopressor 50 mg p.o. b.i.d.   14. Myrbetriq 50 mg p.o. daily.   15. Remeron 15 mg p.o. at bedtime.   16. Nitroglycerin sublingual tablets 0.4 mg p.o. p.r.n. for chest pain.   17. Pravachol 40 mg p.o. at bedtime.   18. Flomax 0.4 mg p.o. at bedtime.     SOCIAL HISTORY: Significant for being a resident of [place], [place]. He lives with his ex-wife. He is . He has a son and daughter in good health. He is disabled due to Parkinson's disease. He has a high school education. He has no history of tobacco, alcohol or drug use. He is a [employer]-day [employer]. He has no recent history of travel outside this region.     He designates his son, Darrick Max , to serve as a surrogate for healthcare matters should such become necessary. His son is also POWER OF .     The patient is a FULL CODE.     FAMILY HISTORY: Significant for having a brother  due to cancer of uncertain type. He has 3 elderly surviving sisters described as being in fair health, although he is not able to elaborate. Both parents are  due to natural causes of uncertain type.     PHYSICAL EXAMINATION:    VITAL SIGNS: Temperature is 97, pulse 80, respirations are 18 and unlabored, blood pressure is 140/62, O2 saturation is 96% breathing ambient air. Weight is 240 pounds.     GENERAL: This is a 71-year-old  male appearing younger than  his documented age. He is resting comfortably in bed. He is in no apparent distress. He is interactive and cooperative. He proves to be a fairly good historian.     HEAD AND NECK: Essentially unremarkable except as noted. I see no signs of acute trauma. Eyes, nose, and throat are grossly unremarkable. Sclerae are clear. There is no discharge from the nostrils. Mucous membranes are moist.     NECK: Supple. He has no cervical or clavicular adenopathy. He has no carotid bruits. There are no masses of the head or neck. Neck veins do not appear pathologically distended.     CARDIAC: Reveals S1 and S2 with a regular rhythm. He has no murmurs, rubs, or gallops.     LUNGS: Reveals bilateral breath sounds are clear to auscultation throughout. He has no rales, wheezes, or rhonchi.     ABDOMEN: Reveals bowel sounds to be present. His abdomen is nontender, nondistended and soft. He is obese.     EXTREMITIES: No lower extremity edema, erythema or calf tenderness.     NEUROLOGIC: Reveals the patient to be awake and alert. He seems oriented to person, place, time and situation. Cranial nerves II-XII are grossly intact. He exhibits no definite acute focal motor or sensory deficits. He exhibits no bradykinesia. He has cogwheeling of the upper extremities. He is able to stand with assistance, but is unable to ambulate.     PSYCHIATRIC: Reveals his mood to be stable. Affect seems appropriate. Thought processes are organized in that he is able to answer questions appropriately and provide a coherent history. Speech is fluent, but measured and slow. There is no flight of ideas. There are no obvious short-term or long-term memory deficits. There or no obvious detectable short-term or long-term memory deficits.     DIAGNOSTIC DATA: Complete metabolic panel is essentially unremarkable, except for BUN of 28.     CBC demonstrates a white blood cell count of 8.48, hemoglobin 10.3, hematocrit 34.1, platelet count of 274,000.     Prothrombin  time and PTT are unremarkable.     D-dimer is 3.59.     Troponin level is unremarkable.     Magnesium level is 1.9.     Urinalysis demonstrates specific gravity of 1.010 with a pH of 6.0. His urine contains a trace amount of leukocyte esterase and 3-5 white blood cells per high-power field.     ProBNP is 2120.     Chest x-ray demonstrates no acute abnormalities.     CT of the chest demonstrates no pulmonary embolism or other acute disease.     EKG demonstrates sinus rhythm of 81 beats.     IMPRESSION:  1. Progressive weakness and fatigue likely related to advancing Parkinson's disease.   2. Parkinson's disease.   3. Coronary artery disease.   4. Acute congestive heart failure due to diastolic dysfunction.   5. Hypertension.   6. Dyslipidemia.   7. Anemia.   8. Urinary tract infection.    PLAN: At this time, Mr. Max will be admitted to [facility] for further evaluation and treatment. His admitting diagnoses are as noted. His condition at this time is judged to be stable. He will be placed on telemetry.     I have asked the nursing staff to obtain vital signs per protocol. He will be confined to bedrest. His allergy to PENICILLIN is noted. I have asked the nursing staff to monitor input and output. Daily weights will be obtained. He will be maintained on a regular diet. IV fluids will be saline locked. Oxygen will be used as needed to maintain his O2 saturation greater than 92%. He is a FULL CODE. Fall precautions are to be instituted.     I will consult the neurology service.     INITIAL ADMITTING MEDICATIONS:   1. Amantadine 100 mg p.o. b.i.d.   2. Aspirin 81 mg p.o. daily.   3. Sinemet 25/100 p.o. t.i.d. and 500/200, take 1 p.o. at bedtime.   4. Plavix 75 mg p.o. daily.   5. Aricept 10 mg p.o. at bedtime.   6. Lovenox 40 mg subcutaneous daily.   7. Lasix 40 mg IV b.i.d.   8. Lisinopril 20 mg p.o. daily.   9. Metoprolol 50 mg p.o. q.12 h.   10. Remeron 15 mg p.o. at bedtime.   11. Pravachol 20 mg  p.o. at bedtime.   12. Flomax 0.4 mg p.o. at bedtime.   13. Levaquin 500 mg IV daily.   14. Tylenol 650 mg p.o. q.6 h. p.r.n. for fever and/or discomfort.   15. DuoNeb 1 unit q.4 h. p.r.n. for shortness of breath.   16. Nitroglycerin sublingual tablets 0.4 mg p.o. p.r.n. for chest pain.   17. Zofran 4 mg IV q.6 h. p.r.n. for nausea and vomiting.     I will obtain followup laboratory studies and a cardiac echo in the morning.      I will continue to follow Mr. Max closely through the night pending return of the hospitalist team in the morning. The nursing staff may call should they have any questions or concerns. Please refer to the medical record for additional information, orders and/or comments.        Shiraz Horta M.D.  SHARONA/[id number]  D:  02/18/2017 23:35:11(Eastern Time)  T:  02/19/2017 00:18:22(Eastern Time)  Voice ID:  [id number]/Document ID:  [id number]

## 2017-02-19 NOTE — ED PROVIDER NOTES
Subjective   History of Present Illness    Review of Systems    Past Medical History   Diagnosis Date   • Acute systolic congestive heart failure 2/18/2017   • Arrhythmia    • Arthritis of knee, right    • BPH (benign prostatic hypertrophy)    • CAD (coronary artery disease)    • Hyperlipidemia    • Hypertension    • Parkinson disease    • Parkinson's disease        Allergies   Allergen Reactions   • Penicillins        Past Surgical History   Procedure Laterality Date   • Coronary angioplasty with stent placement     • Tonsillectomy     • Pr rt/lt heart catheters N/A 9/23/2016     Procedure: Percutaneous Coronary Intervention;  Surgeon: Pedro Singh MD;  Location:  [facility] CATH IN[facility]ASIVE LOC[facility];  Service: Cardiovascular   • Cardiac catheterization N/A 9/23/2016     Procedure: Left Heart Cath;  Surgeon: Juan J Salvador MD;  Location:  [facility];  Service:    • Cardiac catheterization N/A 9/23/2016     Procedure: Stent BMS coronary;  Surgeon: Pedro Singh MD;  Location:  [facility]ATH [facility];  Service:        Family History   Problem Relation Age of Onset   • Hypertension Mother    • Alzheimer's disease Mother    • Diabetes Father        Social History     Social History   • Marital status: Single     Spouse name: N/A   • Number of children: N/A   • Years of education: N/A     Social History Main Topics   • Smoking status: Never Smoker   • Smokeless tobacco: Never Used   • Alcohol use No   • Drug use: No   • Sexual activity: Defer     Other Topics Concern   • None     Social History Narrative    PATIENT HAS ONLY LIVED IN THIS AREA FOR 7-8 MONTHS.  HE IS FROM [place].  SAYS HE MISSES HIS HOME STATE.  HIS MALE COUSIN IS HIS NEXT OF KIN           Objective   Physical Exam    Procedures         ED Course  ED Course   Comment By Time   I told the patient and his spouse that we could not just admit him to transition [facility] to [facility] as the [facility].  If we found something that was  an admittable diagnosis we would be more than happy to put him in the hospital but we had to find that admittable and treatable diagnosis first.We will check him out real good and see how he is doing.  The wife then spoke with [facility] the [facility] on [facility] phone and was told if we could not find an admittable diagnosis that they would go back home by ambulance. Dre Gee Jr., MD 02/18 1800   Patient was turned over to me is getting progressive worsening shortness of breath CT of the chest is negative for a pulmonary embolus his BNP is 2120  The  was consulted and they informing the patient can be admitted to the hospital for CHF some low-grade anemia German Hernández MD 02/18 1959                  [facility]    Final diagnoses:   Acute systolic congestive heart failure            German Hernández MD  02/18/17 2001

## 2017-02-19 NOTE — PROGRESS NOTES
Discharge Planning Assessment  [facility]     Patient Name: Darrick Max  MRN: [id number]  Today's Date: 2/19/2017    Admit Date: 2/18/2017          Discharge Needs Assessment     None            Discharge Plan       02/19/17 0846    Case Management/Social Work Plan    Plan Spoke with pt's ex-wife, Gloria Molina [phone] (who is pt's caregiver).  They have decided pt needs NHP and requested a referral be made to [facility].   has faxed referral and will follow for bed offer.  ROMEL Arrington.    Patient/Family In Agreement With Plan yes        Discharge Placement     No information found                Demographic Summary     None            Functional Status     None            Psychosocial     None            Abuse/Neglect     None            Legal     None            Substance Abuse     None            Patient Forms     None          ROMEL Marmolejo

## 2017-02-19 NOTE — PROGRESS NOTES
"Admitted from ED to INPT telemetry to DR Horta with acute systolic CHF.  Medicare/ [employer] insurance.  Staff was told that pt's caregiver was told by [facility] to [facility] for [facility] to [facility].  I spoke to caregiver Gloria Molina ([phone]) at bedside with permission from pt and she did tell me that they contacted [facility]  and was told to come here.  I contacted [facility] and spoke to CHANCE Huffman and there were no notes indicating that they sent pt here.  We are presuming that it was \"go to the nearest emergency room\" direction from the  which the care taker understood.  [facility] had told me that they did have beds and if patient was going to be admitted and wanted to go to [place] to fax clinical.  We were still evaluating pt at that time.  Pt/ caregiver agreed that they did not want to [facility] to [facility] and that he would use his Medicare here.  Explained what is needed ( 3 day INPT stay )for NHP to caregiver.  She says he has gotten weaker and weaker and more short of breath and she cannot lift him anymore.  Pt's BNP 2120 (last one 294).  EF from 10/2016 was 54.2.  Pt meets INPT IQ pg 254/ day one/ acute/ dyspnea / EF>40%/ all interventions.  INPT order by ED Md and by Dr Horta.  .Ashely Thomas, RN.2/18/2017.8:45 PM  "

## 2017-02-19 NOTE — PLAN OF CARE
Problem: Patient Care Overview (Adult)  Goal: Plan of Care Review  Outcome: Ongoing (interventions implemented as appropriate)    02/19/17 0402   Coping/Psychosocial Response Interventions   Plan Of Care Reviewed With patient   Patient Care Overview   Progress no change   Outcome Evaluation   Outcome Summary/Follow up Plan VSS. Good output. No c/o pain this shift. Pt is very unstable and weak, needs assisance of 2 people or more. Labs pendinfg. Echo to be done today.        Goal: Adult Individualization and Mutuality  Outcome: Ongoing (interventions implemented as appropriate)  Goal: Discharge Needs Assessment  Outcome: Ongoing (interventions implemented as appropriate)    Problem: Cardiac: Heart Failure (Adult)  Goal: Signs and Symptoms of Listed Potential Problems Will be Absent or Manageable (Cardiac: Heart Failure)  Outcome: Ongoing (interventions implemented as appropriate)    Problem: Fall Risk (Adult)  Goal: Identify Related Risk Factors and Signs and Symptoms  Outcome: Ongoing (interventions implemented as appropriate)  Goal: Absence of Falls  Outcome: Ongoing (interventions implemented as appropriate)    Problem: Skin Integrity Impairment, Risk/Actual (Adult)  Goal: Identify Related Risk Factors and Signs and Symptoms  Outcome: Ongoing (interventions implemented as appropriate)  Goal: Skin Integrity/Wound Healing  Outcome: Ongoing (interventions implemented as appropriate)

## 2017-02-20 PROCEDURE — G8988 SELF CARE GOAL STATUS: HCPCS

## 2017-02-20 PROCEDURE — 25010000002 FUROSEMIDE PER 20 MG: Performed by: INTERNAL MEDICINE

## 2017-02-20 PROCEDURE — 99232 SBSQ HOSP IP/OBS MODERATE 35: CPT | Performed by: PSYCHIATRY & NEUROLOGY

## 2017-02-20 PROCEDURE — G8978 MOBILITY CURRENT STATUS: HCPCS

## 2017-02-20 PROCEDURE — 25010000002 ENOXAPARIN PER 10 MG: Performed by: INTERNAL MEDICINE

## 2017-02-20 PROCEDURE — G8987 SELF CARE CURRENT STATUS: HCPCS

## 2017-02-20 PROCEDURE — 97166 OT EVAL MOD COMPLEX 45 MIN: CPT

## 2017-02-20 PROCEDURE — 97162 PT EVAL MOD COMPLEX 30 MIN: CPT

## 2017-02-20 PROCEDURE — G8979 MOBILITY GOAL STATUS: HCPCS

## 2017-02-20 RX ORDER — FUROSEMIDE 20 MG/1
20 TABLET ORAL DAILY
Status: DISCONTINUED | OUTPATIENT
Start: 2017-02-21 | End: 2017-02-21 | Stop reason: HOSPADM

## 2017-02-20 RX ADMIN — TAMSULOSIN HYDROCHLORIDE 0.4 MG: 0.4 CAPSULE ORAL at 21:09

## 2017-02-20 RX ADMIN — CARBIDOPA AND LEVODOPA 1 TABLET: 25; 100 TABLET ORAL at 08:43

## 2017-02-20 RX ADMIN — METOPROLOL TARTRATE 50 MG: 50 TABLET ORAL at 08:43

## 2017-02-20 RX ADMIN — AMANTADINE HYDROCHLORIDE 100 MG: 100 TABLET ORAL at 08:43

## 2017-02-20 RX ADMIN — ACETAMINOPHEN 650 MG: 325 TABLET ORAL at 08:43

## 2017-02-20 RX ADMIN — MIRTAZAPINE 15 MG: 15 TABLET, FILM COATED ORAL at 21:08

## 2017-02-20 RX ADMIN — CLOPIDOGREL BISULFATE 75 MG: 75 TABLET, FILM COATED ORAL at 17:10

## 2017-02-20 RX ADMIN — DONEPEZIL HYDROCHLORIDE 10 MG: 10 TABLET, FILM COATED ORAL at 21:08

## 2017-02-20 RX ADMIN — AMANTADINE HYDROCHLORIDE 100 MG: 100 TABLET ORAL at 21:08

## 2017-02-20 RX ADMIN — CARBIDOPA AND LEVODOPA 1 TABLET: 25; 100 TABLET ORAL at 17:10

## 2017-02-20 RX ADMIN — CARBIDOPA AND LEVODOPA 1 TABLET: 50; 200 TABLET, EXTENDED RELEASE ORAL at 21:08

## 2017-02-20 RX ADMIN — PRAVASTATIN SODIUM 20 MG: 20 TABLET ORAL at 21:08

## 2017-02-20 RX ADMIN — ASPIRIN 81 MG: 81 TABLET ORAL at 08:43

## 2017-02-20 RX ADMIN — LISINOPRIL 20 MG: 20 TABLET ORAL at 08:43

## 2017-02-20 RX ADMIN — METOPROLOL TARTRATE 50 MG: 50 TABLET ORAL at 21:08

## 2017-02-20 RX ADMIN — ENOXAPARIN SODIUM 40 MG: 40 INJECTION SUBCUTANEOUS at 08:42

## 2017-02-20 RX ADMIN — CARBIDOPA AND LEVODOPA 1 TABLET: 25; 100 TABLET ORAL at 12:48

## 2017-02-20 RX ADMIN — FUROSEMIDE 40 MG: 10 INJECTION, SOLUTION INTRAMUSCULAR; INTRAVENOUS at 08:43

## 2017-02-20 NOTE — PROGRESS NOTES
Acute Care - Physical Therapy Initial Evaluation  [facility]     Patient Name: Darrick Max  : 1945  MRN: [id number]  Today's Date: 2017   Onset of Illness/Injury or Date of Surgery Date: 17  Date of Referral to PT: 17  Referring Physician: Dr. Alberts      Admit Date: 2017     Visit Dx:    ICD-10-CM ICD-9-CM   1. Acute systolic congestive heart failure I50.21 428.21     428.0   2. Decreased activities of daily living (ADL) Z78.9 V49.89   3. Impaired functional mobility, balance, gait, and endurance Z74.09 V49.89     Patient Active Problem List   Diagnosis   • CAD (coronary artery disease)   • Hypertension   • Parkinson's disease   • Hyperlipemia   • Osteoarthritis of right knee   • Arthritis of knee, right   • Coronary artery disease involving native coronary artery of native heart with angina pectoris   • Chest pain   • Non morbid obesity due to excess calories   • Acute systolic congestive heart failure     Past Medical History   Diagnosis Date   • Acute systolic congestive heart failure 2017   • Arrhythmia    • Arthritis of knee, right    • BPH (benign prostatic hypertrophy)    • CAD (coronary artery disease)    • Hyperlipidemia    • Hypertension    • Parkinson disease    • Parkinson's disease      Past Surgical History   Procedure Laterality Date   • Coronary angioplasty with stent placement     • Tonsillectomy     • Pr rt/lt heart catheters N/A 2016     Procedure: Percutaneous Coronary Intervention;  Surgeon: Pedro Singh MD;  Location:  [facility];  Service: Cardiovascular   • Cardiac catheterization N/A 2016     Procedure: Left Heart Cath;  Surgeon: Juan J Salvador MD;  Location:  [facility];  Service:    • Cardiac catheterization N/A 2016     Procedure: Stent BMS coronary;  Surgeon: Pedro Singh MD;  Location:  [facility];  Service:           PT ASSESSMENT (last 72 hours)      PT Evaluation        02/20/17 0810 02/20/17 0807    Rehab Evaluation    Document Type evaluation   See MAR  -ND evaluation   see MAR   -PB (r) MC (t) PB (c)    Subjective Information agree to therapy;complains of;pain;numbness;swelling   numbness in RLE, swelling  BLE  -ND agree to therapy;complains of;pain;numbness   numb in RLE  -PB (r) MC (t) PB (c)    Patient Effort, Rehab Treatment  adequate  -PB (r) MC (t) PB (c)    General Information    Patient Profile Review yes  -ND yes  -PB (r) MC (t) PB (c)    Onset of Illness/Injury or Date of Surgery Date 02/18/17  -ND 02/18/17  -PB (r) MC (t) PB (c)    Referring Physician Dr. Alberts  -ND Dr. Alberts  -PB (r) MC (t) PB (c)    General Observations Pt. lying fowlers, telemetry, IV site, delayed response to answering questions  -ND pt barth's in bed with telemetry, IV site, flat affect  -PB (r) MC (t) PB (c)    Pertinent History Of Current Problem 2/18 pt presents with CC: fatigue, malaise, lethargy, general weakness, immobility; Dx: acute systolic CHF, progressive weakness due to progressing Parkinson's disease, acute diastolic heart failure, HTN, dyslipidemia, anemia-iron deficency. All labs therapeutic   -ND 2/18 pt presents with CC: fatigue, malaise, lethargy, general weakness, immobility; Dx: acute systolic CHF, progressive weakness due to progressing Parkinson's disease, acute diastolic heart failure, HTN, dyslipidemia, anemia-iron deficency   -PB (r) MC (t) PB (c)    Precautions/Limitations fall precautions  -ND fall precautions  -PB (r) MC (t) PB (c)    Prior Level of Function min assist:;dressing;bathing;cooking;independent:;transfer;bed mobility;driving   Pt. not able to give straightforward answers.   -ND max assist:;transfer;gait;independent:;bed mobility;min assist:;ADL's   pt is not consistent with description of prior function  -PB (r) MC (t) PB (c)    Equipment Currently Used at Home walker, rolling;cane, straight;raised toilet;shower chair  -ND walker, rolling;cane,  straight;shower chair  -PB (r) MC (t) PB (c)    Plans/Goals Discussed With patient;agreed upon  -ND patient;agreed upon  -PB (r) MC (t) PB (c)    Risks Reviewed patient:;LOB;nausea/vomiting;dizziness;increased discomfort;change in vital signs  -ND patient:;LOB;nausea/vomiting;dizziness;increased discomfort;change in vital signs  -PB (r) MC (t) PB (c)    Benefits Reviewed patient:;improve function;increase independence;increase strength;increase balance;decrease pain;increase knowledge  -ND patient:;improve function;increase independence;increase strength;decrease pain;increase balance  -PB (r) MC (t) PB (c)    Barriers to Rehab medically complex;cognitive status;previous functional deficit;physical barrier  -ND medically complex;previous functional deficit;cognitive status;physical barrier  -PB (r) MC (t) PB (c)    Living Environment    Lives With other (see comments)   ex wife assists with pt care  -ND other (see comments)   ex wife is care taker  -PB (r) MC (t) PB (c)    Living Arrangements house  -ND house  -PB (r) MC (t) PB (c)    Home Accessibility bed and bath on same level;tub/shower is not walk in;grab bars present (bathtub);stairs to enter home  -ND bed and bath on same level;grab bars present (bathtub);stairs to enter home  -PB (r) MC (t) PB (c)    Number of Stairs to Enter Home 1  -ND 1  -PB (r) MC (t) PB (c)    Stair Railings at Home outside, present on right side  -ND outside, present on right side  -PB (r) MC (t) PB (c)    Living Environment Comment Living environment may not be  100% correct  -ND living environment may not be correct due to pt cognition  -PB (r) MC (t) PB (c)    Clinical Impression    Date of Referral to PT  02/19/17  -PB (r) MC (t) PB (c)    PT Diagnosis  impaired mobility, decreased activity tolerance, decreased ROM, decreased balance  -PB (r) MC (t) PB (c)    Patient/Family Goals Statement  return home  -PB (r) MC (t) PB (c)    Criteria for Skilled Therapeutic Interventions Met   yes;treatment indicated  -PB (r) MC (t) PB (c)    Pathology/Pathophysiology Noted (Describe Specifically for Each System)  musculoskeletal  -PB (r) MC (t) PB (c)    Impairments Found (describe specific impairments)  aerobic capacity/endurance;arousal, attention, and cognition;gait, locomotion, and balance;muscle performance;motor function;ROM  -PB (r) MC (t) PB (c)    Functional Limitations in Following Categories (Describe Specific Limitations)  self-care  -PB (r) MC (t) PB (c)    Disability: Inability to Perform Actions/Activities of Required Roles (describe specific disability)  community/leisure  -PB (r) MC (t) PB (c)    Rehab Potential  fair, will monitor progress closely  -PB (r) MC (t) PB (c)    Predicted Duration of Therapy Intervention (days/wks)  until d/c  -PB (r) MC (t) PB (c)    Vital Signs    Intra SpO2 (%) 97  -ND     O2 Delivery Intra Treatment room air  -ND     Intra Patient Position Sitting  -ND     Pain Assessment    Pain Assessment 0-10  -ND 0-10  -PB (r) MC (t) PB (c)    Pain Score 9  -ND 9  -PB (r) MC (t) PB (c)    Pain Type Acute pain  -ND Acute pain  -PB (r) MC (t) PB (c)    Pain Location --   R. hip through through ankle  -ND Leg  -PB (r) MC (t) PB (c)    Pain Orientation  Right  -PB (r) MC (t) PB (c)    Pain Descriptors Aching;Radiating  -ND Aching  -PB (r) MC (t) PB (c)    Pain Frequency Constant/continuous  -ND Constant/continuous  -PB (r) MC (t) PB (c)    Pain Intervention(s) Medication (See MAR);Repositioned  -ND Medication (See MAR);Ambulation/increased activity;Repositioned  -PB (r) MC (t) PB (c)    Response to Interventions tolerated  -ND tolerated  -PB (r) MC (t) PB (c)    Vision Assessment/Intervention    Visual Impairment WFL with corrective lenses  -ND WFL with corrective lenses  -PB (r) MC (t) PB (c)    Cognitive Assessment/Intervention    Current Cognitive/Communication Assessment functional  -ND impaired   pt Ox4 but seems slow to answer and confused   -PB (r) MC (t) PB (c)     Orientation Status oriented x 4  -ND oriented x 4   pt reports history of hallucinations due to medications  -PB (r) MC (t) PB (c)    Follows Commands/Answers Questions needs increased time;100% of the time;able to follow single-step instructions;needs repetition   flat affect  -ND able to follow single-step instructions;75% of the time;needs cueing;needs increased time;needs repetition  -PB (r) MC (t) PB (c)    Personal Safety decreased awareness, need for assist;decreased awareness, need for safety;decreased insight to deficits  -ND fully aware of deficits   pt is self-limiting   -PB (r) MC (t) PB (c)    Personal Safety Interventions fall prevention program maintained;gait belt;nonskid shoes/slippers when out of bed;supervised activity  -ND fall prevention program maintained;gait belt;nonskid shoes/slippers when out of bed;supervised activity  -PB (r) MC (t) PB (c)    ROM (Range of Motion)    General ROM no range of motion deficits identified  -ND     General ROM Detail BUE AROM WFL  -ND BLE limited knee ext due to decreased hamstring length  -PB (r) MC (t) PB (c)    MMT (Manual Muscle Testing)    General MMT Assessment upper extremity strength deficits identified  -ND     General MMT Assessment Detail Functionally 4/5  -ND BLE grossly 4+/5  -PB (r) MC (t) PB (c)    Bed Mobility, Assessment/Treatment    Bed Mobility, Assistive Device bed rails;head of bed elevated  -ND bed rails;head of bed elevated  -PB (r) MC (t) PB (c)    Bed Mobility, Scoot/Bridge, [place] verbal cues required;nonverbal cues required (demo/gesture);minimum assist (75% patient effort)   in [place] position  -ND verbal cues required;nonverbal cues required (demo/gesture);set up required;contact guard assist  -PB (r) MC (t) PB (c)    Bed Mob, Supine to Sit, [place] minimum assist (75% patient effort)  -ND set up required;verbal cues required;nonverbal cues required (demo/gesture);minimum assist (75% patient effort)  -PB (r) MC (t)  PB (c)    Bed Mob, Sit to Supine, [place] minimum assist (75% patient effort)  -ND verbal cues required;nonverbal cues required (demo/gesture);set up required;minimum assist (75% patient effort)  -PB (r) MC (t) PB (c)    Bed Mobility, Safety Issues decreased use of legs for bridging/pushing  -ND cognitive deficits limit understanding;decreased use of arms for pushing/pulling;decreased use of legs for bridging/pushing  -PB (r) MC (t) PB (c)    Bed Mobility, Impairments impaired balance;strength decreased;postural control impaired  -ND ROM decreased;impaired balance;motor control impaired;pain;coordination impaired  -PB (r) MC (t) PB (c)    Transfer Assessment/Treatment    Transfers, Sit-Stand [place] verbal cues required;nonverbal cues required (demo/gesture);moderate assist (50% patient effort);maximum assist (25% patient effort);2 person assist required  -ND moderate assist (50% patient effort);maximum assist (25% patient effort);2 person assist required;verbal cues required;nonverbal cues required (demo/gesture)  -PB (r) MC (t) PB (c)    Transfers, Stand-Sit [place] verbal cues required;nonverbal cues required (demo/gesture);moderate assist (50% patient effort);maximum assist (25% patient effort);2 person assist required  -ND moderate assist (50% patient effort);2 person assist required;verbal cues required;nonverbal cues required (demo/gesture)  -PB (r) MC (t) PB (c)    Transfers, Sit-Stand-Sit, Assist Device bed rails   BUE support  -ND     Transfer, Safety Issues sequencing ability decreased;step length decreased;weight-shifting ability decreased  -ND balance decreased during turns;sequencing ability decreased;step length decreased;weight-shifting ability decreased;loses balance backward   pt unable to inititate foot movements  -PB (r) MC (t) PB (c)    Transfer, Impairments strength decreased;impaired balance;postural control impaired;pain;coordination impaired;motor control impaired  -ND ROM  decreased;decreased flexibility;impaired balance;coordination impaired;motor control impaired;pain  -PB (r) MC (t) PB (c)    Transfer, Comment Pt. completed sit to stand  t/f from  bed x3 occurances ranging from Mod/Max x2 with BUE support.   -ND attempted bed>chair transfer but deferred due to pt endurance and difficulty with BLE movements  -PB (r) MC (t) PB (c)    Gait Assessment/Treatment    Gait, [place] Level  verbal cues required;nonverbal cues required (demo/gesture);maximum assist (25% patient effort);2 person assist required;hand held assist  -PB (r) MC (t) PB (c)    Gait, Assistive Device  --   HHAx2  -PB (r) MC (t) PB (c)    Gait, Distance (Feet)  2  -PB (r) MC (t) PB (c)    Gait, Gait Pattern Analysis  --   side step toward HOB  -PB (r) MC (t) PB (c)    Gait, Gait Deviations  festinating;forward flexed posture;double stance time increased;pronated;step length decreased;weight-shifting ability decreased  -PB (r) MC (t) PB (c)    Gait, Safety Issues  sequencing ability decreased;step length decreased;weight-shifting ability decreased;loses balance backward  -PB (r) MC (t) PB (c)    Gait, Impairments  decreased flexibility;ROM decreased;impaired balance;motor control impaired;coordination impaired;pain  -PB (r) MC (t) PB (c)    Gait, Comment  pt required MaxA to initiate side steps with Max Cues  -PB (r) MC (t) PB (c)    Motor Skills/Interventions    Additional Documentation Balance Skills Training (Group);Fine Motor Coordination Training (Group);Gross Motor Coordination Training (Group)  -ND Balance Skills Training (Group)  -PB (r) MC (t) PB (c)    Balance Skills Training    Sitting-Level of Assistance Close supervision  -ND Close supervision;Contact guard  -PB (r) MC (t) PB (c)    Sitting-Balance Support Right upper extremity supported;Left upper extremity supported;Feet supported  -ND Right upper extremity supported;Left upper extremity supported;Feet supported  -PB (r) MC (t) PB (c)     "Standing-Level of Assistance Minimum assistance;x2  -ND Minimum assistance;x2  -PB (r) MC (t) PB (c)    Static Standing Balance Support Right upper extremity supported;Left upper extremity supported  -ND Right upper extremity supported;Left upper extremity supported  -PB (r) MC (t) PB (c)    Gait Balance-Level of Assistance Maximum assistance;x2  -ND Maximum assistance;x2  -PB (r) MC (t) PB (c)    Gait Balance Support Left upper extremity supported;Right upper extremity supported  -ND Right upper extremity supported;Left upper extremity supported  -PB (r) MC (t) PB (c)    Gait Balance Activities  side-stepping  -PB (r) MC (t) PB (c)    Fine Motor Coordination Training    Opposition Right:;Left:;intact  -ND     Gross Motor Coordination Training    Gross Motor Skill, Impairments Detail BUE FTN intact, BUE LEORA Intact  -ND     Sensory Assessment/Intervention    Sensory Impairment numbness   Numbness in RLE  -ND     Light Touch  --   BLE WFL, however pt reports numb pain  -PB (r) MC (t) PB (c)    Edema Management    Edema Amount right:;left:;minimal   BLE  -ND     Positioning and Restraints    Pre-Treatment Position in bed  -ND in bed  -PB (r) MC (t) PB (c)    Post Treatment Position bed  -ND bed  -PB (r) MC (t) PB (c)    In Bed fowlers;call light within reach;encouraged to call for assist;with family/caregiver;side rails up x2;legs elevated  -ND fowlers;notified nsg;call light within reach;encouraged to call for assist;side rails up x2;legs elevated  -PB (r) MC (t) PB (c)      02/18/17 2129 02/18/17 2122    General Information    Equipment Currently Used at Home  walker, rolling;raised toilet  -KJ    Living Environment    Lives With other (see comments)   ex wife \"caretaker\"  -KJ     Living Arrangements house  -KJ     Home Accessibility no concerns  -KJ     Stair Railings at Home outside, present on left side  -KJ     Type of Financial/Environmental Concern none  -KJ     Transportation Available car;family or friend " will provide  -KJ       User Key  (r) = Recorded By, (t) = Taken By, (c) = Cosigned By    Initials Name Provider Type    KJ Bertha Sommer, RN Registered Nurse    PB Elton Zavala, PT DPT Physical Therapist    NATHALIA Whitaker, OTR/L Occupational Therapist    MAGALI Mcelroy, PT Student PT Student          Physical Therapy Education     Title: PT OT SLP Therapies (Active)     Topic: Physical Therapy (Active)     Point: Mobility training (Active)    Learning Progress Summary    Learner Readiness Method Response Comment Documented by Status   Patient Acceptance E NR Discussed the role of PT. discussed the benefits of activity and mobility. Instructed the pt on appropriate hand placement during transfers. Discussed the necessity of calling for help before trying to get up and move to avoid falls/injuries.  02/20/17 0925 Active               Point: Body mechanics (Active)    Learning Progress Summary    Learner Readiness Method Response Comment Documented by Status   Patient Acceptance E NR Discussed the role of PT. discussed the benefits of activity and mobility. Instructed the pt on appropriate hand placement during transfers. Discussed the necessity of calling for help before trying to get up and move to avoid falls/injuries.  02/20/17 0925 Active               Point: Precautions (Active)    Learning Progress Summary    Learner Readiness Method Response Comment Documented by Status   Patient Acceptance E NR Discussed the role of PT. discussed the benefits of activity and mobility. Instructed the pt on appropriate hand placement during transfers. Discussed the necessity of calling for help before trying to get up and move to avoid falls/injuries.  02/20/17 0925 Active                      User Key     Initials Effective Dates Name Provider Type Discipline     12/19/16 -  Julia Mcelroy, PT Student PT Student PT                PT Recommendation and Plan  Anticipated Discharge Disposition: skilled  nursing facility  Planned Therapy Interventions: balance training, bed mobility training, gait training, home exercise program, patient/family education, ROM (Range of Motion), stretching, transfer training, motor coordination training  PT Frequency: daily, 2 times/day, per priority policy  Plan of Care Review  Plan Of Care Reviewed With: patient  Outcome Summary/Follow up Plan: PT eval completed. Pt reports of living environment and prior level of function may not be accurate due to confusion and decreased cognition. Pt completed bed mobility with set up, bed rails, HOB elevated, and Christine . Pt transfered sit<>stand with MaxAx2 and max cues. Pt side stepped toward HOB x2 steps with MaxA for step initiation and max cues. Pt would benefit from skilled PT intervention to address imparied mobility, decreased balance, decreased activity tolerance, and impaired motor coordination. Anticipated d/c is to SNF.          IP PT Goals       02/20/17 0932          Bed Mobility PT LTG    Bed Mobility PT LTG, Date Established 02/20/17  -PB (r) MC (t) PB (c)      Bed Mobility PT LTG, Time to Achieve by discharge  -PB (r) MC (t) PB (c)      Bed Mobility PT LTG, Activity Type all bed mobility  -PB (r) MC (t) PB (c)      Bed Mobility PT LTG, [place] Level conditional independence  -PB (r) MC (t) PB (c)      Bed Mobility PT Goal  LTG, Assist Device bed rails  -PB (r) MC (t) PB (c)      Transfer Training PT LTG    Transfer Training PT LTG, Date Established 02/20/17  -PB (r) MC (t) PB (c)      Transfer Training PT LTG, Time to Achieve by discharge  -PB (r) MC (t) PB (c)      Transfer Training PT LTG, Activity Type bed to chair /chair to bed;sit to stand/stand to sit  -PB (r) MC (t) PB (c)      Transfer Training PT LTG, [place] Level minimum assist (75% patient effort);set up required  -PB (r) MC (t) PB (c)      Transfer Training PT LTG, Assist Device walker, rolling  -PB (r) MC (t) PB (c)      Gait Training PT LTG    Gait  Training Goal PT LTG, Date Established 02/20/17  -PB (r) MC (t) PB (c)      Gait Training Goal PT LTG, Time to Achieve by discharge  -PB (r) MC (t) PB (c)      Gait Training Goal PT LTG, [place] Level minimum assist (75% patient effort)  -PB (r) MC (t) PB (c)      Gait Training Goal PT LTG, Assist Device walker, rolling  -PB (r) MC (t) PB (c)      Gait Training Goal PT LTG, Distance to Achieve 15ft   -PB (r) MC (t) PB (c)        User Key  (r) = Recorded By, (t) = Taken By, (c) = Cosigned By    Initials Name Provider Type    STEWART Zavala, PT DPT Physical Therapist    MAGALI Mcelroy, PT Student PT Student                Outcome Measures       02/20/17 0900 02/20/17 0807       How much help from another person do you currently need...    Turning from your back to your side while in flat bed without using bedrails?  2  -PB (r) MC (t) PB (c)     Moving from lying on back to sitting on the side of a flat bed without bedrails?  2  -PB (r) MC (t) PB (c)     Moving to and from a bed to a chair (including a wheelchair)?  1  -PB (r) MC (t) PB (c)     Standing up from a chair using your arms (e.g., wheelchair, bedside chair)?  2  -PB (r) MC (t) PB (c)     Climbing 3-5 steps with a railing?  1  -PB (r) MC (t) PB (c)     To walk in hospital room?  1  -PB (r) MC (t) PB (c)     AM-PAC 6 Clicks Score  9  -PB (r) MC (t)     How much help from another is currently needed...    Putting on and taking off regular lower body clothing? 2  -ND      Bathing (including washing, rinsing, and drying) 2  -ND      Toileting (which includes using toilet bed pan or urinal) 2  -ND      Putting on and taking off regular upper body clothing 2  -ND      Taking care of personal grooming (such as brushing teeth) 2  -ND      Eating meals 3  -ND      Score 13  -ND      Functional Assessment    Outcome Measure Options AM-PAC 6 Clicks Daily Activity (OT)  -ND AM-PAC 6 Clicks Basic Mobility (PT)  -PB (r) MC (t) PB (c)       User Key  (r) =  Recorded By, (t) = Taken By, (c) = Cosigned By    Initials Name Provider Type    PB Elton Zavala, PT DPT Physical Therapist    NATHALIA Whitaker, OTR/L Occupational Therapist    MAGALI Mcelroy, PT Student PT Student           Time Calculation:         PT Charges       02/20/17 0927          Time Calculation    Start Time 0824   additional time 1330-5557; total PT time 54min  -PB (r) MC (t) PB (c)      Stop Time 0906  -PB (r) MC (t) PB (c)      Time Calculation (min) 42 min  -PB (r) MC (t)      PT Received On 02/20/17  -PB (r) MC (t) PB (c)      PT Goal Re-Cert Due Date 03/02/17  -PB (r) MC (t) PB (c)        User Key  (r) = Recorded By, (t) = Taken By, (c) = Cosigned By    Initials Name Provider Type    PB Elton Zavala, PT DPT Physical Therapist    MAGALI Mcelroy, PT Student PT Student          Therapy Charges for Today     Code Description Service Date Service Provider Modifiers Qty    34564121097 HC PT EVAL MOD COMPLEXITY 4 2/20/2017 Julia Mcelroy, PT Student GP, KX 1          PT G-Codes  Outcome Measure Options: AM-PAC 6 Clicks Daily Activity (OT)  Score: 9  Functional Limitation: Mobility: Walking and moving around  Mobility: Walking and Moving Around Current Status (): At least 60 percent but less than 80 percent impaired, limited or restricted  Mobility: Walking and Moving Around Goal Status (): At least 40 percent but less than 60 percent impaired, limited or restricted      Julia Mcelroy, PT Student  2/20/2017

## 2017-02-20 NOTE — PROGRESS NOTES
Continued Stay Note   [facility]     Patient Name: Darrick Max  MRN: [id number]  Today's Date: 2/20/2017    Admit Date: 2/18/2017          Discharge Plan       02/20/17 0921    Case Management/Social Work Plan    Plan PT has a bed offer with [facility] that has been accepted. PT may dc to [facility] once 3 day inpatient stay has been met (2/21/17) and/or when medically ready. Will follow. [phone] phone [phone] fax.     Patient/Family In Agreement With Plan yes              Discharge Codes     None            RACHAEL Bazan

## 2017-02-20 NOTE — PROGRESS NOTES
Neurology Progress Note      Chief Complaint:  PD    Subjective     Subjective:    Worked with therapy this morning.  Needs max assist to ambulate and even to make small steps next to bed.  He continues to have a flat affect.  Medications:  Current Facility-Administered Medications   Medication Dose Route Frequency Provider Last Rate Last Dose   • acetaminophen (TYLENOL) tablet 650 mg  650 mg Oral Q6H PRN Shiraz Horta MD   650 mg at 02/20/17 0843   • amantadine (SYMMETREL) tablet 100 mg  100 mg Oral Q12H Shiraz Horta MD   100 mg at 02/20/17 0843   • aspirin EC tablet 81 mg  81 mg Oral Daily Shiraz Horta MD   81 mg at 02/20/17 0843   • carbidopa-levodopa (SINEMET)  MG per tablet 1 tablet  1 tablet Oral TID Shiraz Horta MD   1 tablet at 02/20/17 0843   • carbidopa-levodopa CR (SINEMET CR)  MG per CR tablet 1 tablet  1 tablet Oral Nightly Shiraz Horta MD   1 tablet at 02/19/17 2254   • clopidogrel (PLAVIX) tablet 75 mg  75 mg Oral Daily Shiraz Horta MD   75 mg at 02/19/17 1741   • donepezil (ARICEPT) tablet 10 mg  10 mg Oral Nightly Shiraz Horta MD   10 mg at 02/19/17 2253   • enoxaparin (LOVENOX) syringe 40 mg  40 mg Subcutaneous Daily Shiraz Horta MD   40 mg at 02/20/17 0842   • furosemide (LASIX) injection 40 mg  40 mg Intravenous BID Shiraz Horta MD   40 mg at 02/20/17 0843   • ipratropium-albuterol (DUO-NEB) nebulizer solution 3 mL  3 mL Nebulization Q4H PRN Shiraz Horta MD       • lisinopril (PRINIVIL,ZESTRIL) tablet 20 mg  20 mg Oral Daily Shiraz Horta MD   20 mg at 02/20/17 0843   • metoprolol tartrate (LOPRESSOR) tablet 50 mg  50 mg Oral Q12H Shiraz Horta MD   50 mg at 02/20/17 0843   • mirtazapine (REMERON) tablet 15 mg  15 mg Oral Nightly Shiraz Horta MD   15 mg at 02/19/17 2255   • nitroglycerin (NITROSTAT) SL tablet 0.4 mg  0.4 mg Sublingual Q5 Min PRN Shiraz Horta MD       • ondansetron (ZOFRAN) injection 4 mg  4 mg Intravenous Q6H PRN Shiraz Horta MD       • pravastatin  (PRAVACHOL) tablet 20 mg  20 mg Oral Nightly Shiraz Horta MD   20 mg at 02/19/17 2253   • sodium chloride 0.9 % flush 1-10 mL  1-10 mL Intravenous PRN Shiraz Horta MD       • sodium chloride 0.9 % flush 10 mL  10 mL Intravenous PRN Dre Gee Jr., MD       • tamsulosin (FLOMAX) 24 hr capsule 0.4 mg  0.4 mg Oral Nightly Shiraz Horta MD   0.4 mg at 02/19/17 2254       Review of Systems:   -A 14 point review of systems is completed and is negative except for malaise      Objective      Vital Signs  Temp:  [97.4 °F (36.3 °C)-98.1 °F (36.7 °C)] 98.1 °F (36.7 °C)  Heart Rate:  [58-82] 67  Resp:  [18-20] 18  BP: (124-154)/(55-77) 154/60    General Exam:  Head: Normal cephalic, atraumatic  HEENT: Neck supple  Fundoscopic Exam: No signs of disc edema  CVS: Regular rate and rhythm. No murmurs  Carotid Examination: No bruits  Lungs: Clear to auscultation  Abdomen: Non-tender, Non-distended  Extremities: Edema in lower extremities  Skin: No rashes     Neurologic Exam:     Mental Status:   -Awake, Alert, Oriented X 3  -No word finding difficulties  -No aphasia  -Hypophonia  CN II: Visual fields full. Pupils equally reactive to light  CN III, IV, VI: Extraocular Muscles full with no signs of nystagmus  CN V: symetric  CN VII: Facial motor symmetric. Masked face  CN VIII: Gross hearing intact bilaterally  CN IX: Palate elevates symmetrically  CN X: Palate elevates symmetrically  CN XI: Shoulder shrug symmetric  CN XII: Tongue is midline on protrusion     Motor: (strength out of 5: 1= minimal movement, 2 = movement in plane of gravity, 3 = movement against gravity, 4 = movement against some resistance, 5 = full strength)     -Right Upper Ext: Proximal: 5 Distal: 5  -Left Upper Ext: Proximal: 5 Distal: 5     -Right Lower Ext: Proximal: 5 Distal: 5  -Left Lower Ext: Proximal: 5 Distal: 5     No signs of increased tone currently     DTR:  1+ throughout in all four extremities     Sensory:  -Intact to light touch, pinprick,  temperature, pain, and proprioception     Coordination:  -No signs of resting tremor     Gait  -patient refused     Results Review:    I reviewed the patient's new clinical results.        Assessment/Plan     Hospital Problem List    Active Problems:    Acute systolic congestive heart failure    Impression:  1. Parkinson's Disease - dopaminergic therapy seems to be optimal as there are no signs of increased tone on exam and no resting tremor as well. My fear is this worsening is more secondary to lack of movement long term  2. Dementia  3. Failure to thrive    Plan:    PT/OT  To [facility] ([facility])      Ortega Alberts MD  02/20/17  9:47 AM

## 2017-02-20 NOTE — PROGRESS NOTES
Acute Care - Occupational Therapy Initial Evaluation  [facility]     Patient Name: Darrick Max  : 1945  MRN: [id number]  Today's Date: 2017  Onset of Illness/Injury or Date of Surgery Date: 17  Date of Referral to OT: 17  Referring Physician: Dr. Alberts    Admit Date: 2017       ICD-10-CM ICD-9-CM   1. Acute systolic congestive heart failure I50.21 428.21     428.0   2. Decreased activities of daily living (ADL) Z78.9 V49.89     Patient Active Problem List   Diagnosis   • CAD (coronary artery disease)   • Hypertension   • Parkinson's disease   • Hyperlipemia   • Osteoarthritis of right knee   • Arthritis of knee, right   • Coronary artery disease involving native coronary artery of native heart with angina pectoris   • Chest pain   • Non morbid obesity due to excess calories   • Acute systolic congestive heart failure     Past Medical History   Diagnosis Date   • Acute systolic congestive heart failure 2017   • Arrhythmia    • Arthritis of knee, right    • BPH (benign prostatic hypertrophy)    • CAD (coronary artery disease)    • Hyperlipidemia    • Hypertension    • Parkinson disease    • Parkinson's disease      Past Surgical History   Procedure Laterality Date   • Coronary angioplasty with stent placement     • Tonsillectomy     • Pr rt/lt heart catheters N/A 2016     Procedure: Percutaneous Coronary Intervention;  Surgeon: Pedro Singh MD;  Location: [facility] CATH INVASIVE LOCATION;  Service: Cardiovascular   • Cardiac catheterization N/A 2016     Procedure: Left Heart Cath;  Surgeon: Juan J Salvador MD;  Location:  [facility] CATH INVASIVE LOCATION;  Service:    • Cardiac catheterization N/A 2016     Procedure: Stent BMS coronary;  Surgeon: Pedro Singh MD;  Location:  [facility] CATH INVASIVE LOCATION;  Service:           OT ASSESSMENT FLOWSHEET (last 72 hours)      OT Evaluation       17 0810 17 0807 17       Rehab Evaluation     Document Type evaluation   See MAR  -ND (P)  evaluation   see [facility]       Subjective Information agree to therapy;complains of;pain;numbness;swelling   numbness in RLE, swelling  BLE  -ND (P)  agree to therapy;complains of;pain;numbness   numb in RLE  -       General Information    Patient Profile Review yes  -ND (P)  yes  -       Onset of Illness/Injury or Date of Surgery Date 02/18/17  -ND (P)  02/18/17  -       Referring Physician Dr. Alberts  -ND (P)  Dr. Alberts  -       General Observations Pt. lying fowlers, telemetry, IV site, delayed response to answering questions  -ND (P)  pt barth's in bed with telemetry and breakfast  -       Pertinent History Of Current Problem 2/18 pt presents with CC: fatigue, malaise, lethargy, general weakness, immobility; Dx: acute systolic CHF, progressive weakness due to progressing Parkinson's disease, acute diastolic heart failure, HTN, dyslipidemia, anemia-iron deficency. All labs therapeutic   -ND (P)  2/18 pt presents with CC: fatigue, malaise, lethargy, general weakness, immobility; Dx: acute systolic CHF, progressive weakness due to progressing Parkinson's disease, acute diastolic heart failure, HTN, dyslipidemia, anemia-iron deficency   -       Precautions/Limitations fall precautions  -ND (P)  fall precautions  -       Prior Level of Function min assist:;dressing;bathing;cooking;independent:;transfer;bed mobility;driving   Pt. not able to give straightforward answers.   -ND (P)  max assist:;transfer;gait;independent:;bed mobility;min assist:;ADL's   pt is not consistent with description of prior function  -       Equipment Currently Used at Home walker, rolling;cane, straight;raised toilet;shower chair  -ND (P)  walker, rolling;cane, straight;shower chair  -  walker, rolling;raised toilet  -     Plans/Goals Discussed With patient;agreed upon  -ND (P)  patient;agreed upon  -       Risks Reviewed  "patient:;LOB;nausea/vomiting;dizziness;increased discomfort;change in vital signs  -ND (P)  patient:;LOB;nausea/vomiting;dizziness;increased discomfort;change in vital signs  -       Benefits Reviewed patient:;improve function;increase independence;increase strength;increase balance;decrease pain;increase knowledge  -ND (P)  patient:;improve function;increase independence;increase strength;decrease pain;increase balance  -       Barriers to Rehab medically complex;cognitive status;previous functional deficit;physical barrier  -ND (P)  medically complex;previous functional deficit;cognitive status;physical barrier  -MC       Living Environment    Lives With other (see comments)   ex wife assists with pt care  -ND (P)  other (see comments)   ex wife is care taker  - other (see comments)   ex wife \"caretaker\"  -KJ      Living Arrangements house  -ND (P)  house  -MC house  -KJ      Home Accessibility bed and bath on same level;tub/shower is not walk in;grab bars present (bathtub);stairs to enter home  -ND (P)  bed and bath on same level;grab bars present (bathtub);stairs to enter home  -MC no concerns  -KJ      Number of Stairs to Enter Home 1  -ND (P)  1  -MC       Stair Railings at Home outside, present on right side  -ND (P)  outside, present on right side  -MC outside, present on left side  -KJ      Type of Financial/Environmental Concern   none  -KJ      Transportation Available   car;family or friend will provide  -KJ      Living Environment Comment Living environment may not be  100% correct  -ND (P)  living environment may not be correct due to pt cognition  -MC       Clinical Impression    Date of Referral to OT 02/19/17  -ND        OT Diagnosis decreased adl  -ND        Prognosis fair  -ND        Impairments Found (describe specific impairments) aerobic capacity/endurance;arousal, attention, and cognition;ergonomics and body mechanics;gait, locomotion, and balance;integumentary integrity;joint integrity " and mobility  -ND        Patient/Family Goals Statement to decrease pain and get stronger  -ND        Criteria for Skilled Therapeutic Interventions Met yes;treatment indicated  -ND        Rehab Potential fair, will monitor progress closely  -ND        Therapy Frequency 3-5 times/wk  -ND        Predicted Duration of Therapy Intervention (days/wks) 10 days  -ND        Anticipated Equipment Needs At Discharge bathing equipment;gait belt;dressing equipment  -ND        Anticipated Discharge Disposition skilled nursing facility  -ND        Functional Level Prior    Ambulation    1-->assistive equipment  -KJ     Transferring    2-->assistive person  -KJ     Toileting    3-->assistive equipment and person  -KJ     Bathing    2-->assistive person  -KJ     Dressing    2-->assistive person  -KJ     Eating    0-->independent  -KJ     Communication    0-->understands/communicates without difficulty  -KJ     Swallowing    2-->difficulty swallowing liquids  -KJ     Vital Signs    Intra SpO2 (%) 97  -ND        O2 Delivery Intra Treatment room air  -ND        Intra Patient Position Sitting  -ND        Pain Assessment    Pain Assessment 0-10  -ND (P)  0-10  -       Pain Score 9  -ND (P)  9  -       Pain Type Acute pain  -ND (P)  Acute pain  -       Pain Location --   R. hip through through ankle  -ND (P)  Leg  -       Pain Orientation  (P)  Right  -       Pain Descriptors Aching;Radiating  -ND (P)  Aching  -       Pain Frequency Constant/continuous  -ND (P)  Constant/continuous  -       Pain Intervention(s) Medication (See MAR);Repositioned  -ND (P)  Medication (See MAR);Ambulation/increased activity;Repositioned  -MC       Response to Interventions tolerated  -ND (P)  tolerated  -       Vision Assessment/Intervention    Visual Impairment WFL with corrective lenses  -ND (P)  WFL with corrective lenses  -       Cognitive Assessment/Intervention    Current Cognitive/Communication Assessment functional  -ND (P)   impaired   pt Ox4 but seems slow to answer and confused   -       Orientation Status oriented x 4  -ND (P)  oriented x 4   pt reports history of hallucinations due to medications  -       Follows Commands/Answers Questions needs increased time;100% of the time;able to follow single-step instructions;needs repetition   flat affect  -ND (P)  able to follow single-step instructions;75% of the time;needs cueing;needs increased time;needs repetition  -       Personal Safety decreased awareness, need for assist;decreased awareness, need for safety;decreased insight to deficits  -ND (P)  fully aware of deficits   pt is self-limiting   -       Personal Safety Interventions fall prevention program maintained;gait belt;nonskid shoes/slippers when out of bed;supervised activity  -ND (P)  fall prevention program maintained;gait belt;nonskid shoes/slippers when out of bed;supervised activity  -       ROM (Range of Motion)    General ROM no range of motion deficits identified  -ND        General ROM Detail BUE AROM WFL  -ND (P)  BLE limited knee ext due to decreased hamstring length  -       MMT (Manual Muscle Testing)    General MMT Assessment upper extremity strength deficits identified  -ND        General MMT Assessment Detail Functionally 4/5  -ND (P)  BLE grossly 4+/5  -       Bed Mobility, Assessment/Treatment    Bed Mobility, Assistive Device bed rails;head of bed elevated  -ND (P)  bed rails;head of bed elevated  -       Bed Mobility, Scoot/Bridge, [place] verbal cues required;nonverbal cues required (demo/gesture);minimum assist (75% patient effort)   in [place] position  -ND (P)  verbal cues required;nonverbal cues required (demo/gesture);set up required;contact guard assist  -       Bed Mob, Supine to Sit, [place] minimum assist (75% patient effort)  -ND (P)  set up required;verbal cues required;nonverbal cues required (demo/gesture);minimum assist (75% patient effort)  -       Bed  Mob, Sit to Supine, [place] minimum assist (75% patient effort)  -ND (P)  verbal cues required;nonverbal cues required (demo/gesture);set up required;minimum assist (75% patient effort)  -       Bed Mobility, Safety Issues decreased use of legs for bridging/pushing  -ND (P)  cognitive deficits limit understanding;decreased use of arms for pushing/pulling;decreased use of legs for bridging/pushing  -       Bed Mobility, Impairments impaired balance;strength decreased;postural control impaired  -ND (P)  ROM decreased;impaired balance;motor control impaired;pain;coordination impaired  -       Transfer Assessment/Treatment    Transfers, Sit-Stand [place] verbal cues required;nonverbal cues required (demo/gesture);moderate assist (50% patient effort);maximum assist (25% patient effort);2 person assist required  -ND (P)  moderate assist (50% patient effort);maximum assist (25% patient effort);2 person assist required;verbal cues required;nonverbal cues required (demo/gesture)  -MC       Transfers, Stand-Sit [place] verbal cues required;nonverbal cues required (demo/gesture);moderate assist (50% patient effort);maximum assist (25% patient effort);2 person assist required  -ND (P)  moderate assist (50% patient effort);2 person assist required;verbal cues required;nonverbal cues required (demo/gesture)  -MC       Transfers, Sit-Stand-Sit, Assist Device bed rails   BUE support  -ND        Transfer, Safety Issues sequencing ability decreased;step length decreased;weight-shifting ability decreased  -ND (P)  balance decreased during turns;sequencing ability decreased;step length decreased;weight-shifting ability decreased;loses balance backward   pt unable to inititate foot movements  -       Transfer, Impairments strength decreased;impaired balance;postural control impaired;pain;coordination impaired;motor control impaired  -ND (P)  ROM decreased;decreased flexibility;impaired balance;coordination  impaired;motor control impaired;pain  -MC       Transfer, Comment Pt. completed sit to stand  t/f from  bed x3 occurances ranging from Mod/Max x2 with BUE support.   -ND (P)  attempted bed>chair transfer but deferred due to pt endurance and difficulty with BLE movements  -MC       Functional Mobility    Functional Mobility- Ind. Level maximum assist (25% patient effort);2 person assist required  -ND        Functional Mobility- Device --   BUE support  -ND        Functional Mobility-Distance (Feet) --   2 side steps   -ND        Functional Mobility- Safety Issues step length decreased;sequencing ability decreased;weight-shifting ability decreased  -ND        Functional Mobility- Comment Pt. required Max verbal, tactile cues to take 2 side steps towards HOB. Pt. attempted to complete functional mob to chair however pt. was not able to follow therapist direction for moving BLE and it was unsafe to complete at this time.   -ND        Lower Body Dressing Assessment/Training    LB Dressing Assess/Train, Clothing Type doffing:;donning:;socks  -ND        LB Dressing Assess/Train, Position edge of bed  -ND        LB Dressing Assess/Train, [place] maximum assist (25% patient effort)  -ND        LB Dressing Assess/Train, Impairments decreased flexibility;impaired balance;strength decreased;postural control impaired  -ND        Motor Skills/Interventions    Additional Documentation Balance Skills Training (Group);Fine Motor Coordination Training (Group);Gross Motor Coordination Training (Group)  -ND        Balance Skills Training    Sitting-Level of Assistance Close supervision  -ND        Sitting-Balance Support Right upper extremity supported;Left upper extremity supported;Feet supported  -ND        Standing-Level of Assistance Minimum assistance;x2  -ND        Static Standing Balance Support Right upper extremity supported;Left upper extremity supported  -ND        Gait Balance-Level of Assistance Maximum assistance;x2   -ND        Gait Balance Support Left upper extremity supported;Right upper extremity supported  -ND        Gross Motor Coordination Training    Gross Motor Skill, Impairments Detail BUE FTN intact, BUE [place] Intact  -ND        Fine Motor Coordination Training    Opposition Right:;Left:;intact  -ND        Sensory Assessment/Intervention    Sensory Impairment numbness   Numbness in RLE  -ND        Light Touch  (P)  --   BLE WFL  -MC       Edema Management    Edema Amount right:;left:;minimal   BLE  -ND        General Therapy Interventions    Planned Therapy Interventions activity intolerance;adaptive equipment training;ADL retraining;balance training;bed mobility training;edema management;energy conservation;home exercise program;strengthening;transfer training  -ND        Positioning and Restraints    Pre-Treatment Position in bed  -ND (P)  in bed  -MC       Post Treatment Position bed  -ND (P)  bed  -MC       In Bed fowlers;call light within reach;encouraged to call for assist;with family/caregiver;side rails up x2;legs elevated  -ND (P)  fowlers;notified nsg;call light within reach;encouraged to call for assist;side rails up x2;legs elevated  -         User Key  (r) = Recorded By, (t) = Taken By, (c) = Cosigned By    Initials Name Effective Dates    RACIEL Sommer RN 08/02/16 -     ND Any Whitaker, OTR/L 10/21/16 -     MC Julia Mcelroy, PT Student 12/19/16 -            Occupational Therapy Education     Title: PT OT SLP Therapies (Active)     Topic: Occupational Therapy (Active)     Point: ADL training (Active)    Description: Instruct learner(s) on proper safety adaptation and remediation techniques during self care or transfers.   Instruct in proper use of assistive devices.    Learning Progress Summary    Learner Readiness Method Response Comment Documented by Status   Patient Acceptance E NR Pt. educated on role of OT, safe t/f, safe functional mob, benefit of larger movements, edema management, and  progression with poc ND 02/20/17 0918 Active               Point: Home exercise program (Active)    Description: Instruct learner(s) on appropriate technique for monitoring, assisting and/or progressing therapeutic exercises/activities.    Learning Progress Summary    Learner Readiness Method Response Comment Documented by Status   Patient Acceptance E NR Pt. educated on role of OT, safe t/f, safe functional mob, benefit of larger movements, edema management, and progression with poc ND 02/20/17 0918 Active               Point: Body mechanics (Active)    Description: Instruct learner(s) on proper positioning and spine alignment during self-care, functional mobility activities and/or exercises.    Learning Progress Summary    Learner Readiness Method Response Comment Documented by Status   Patient Acceptance E NR Pt. educated on role of OT, safe t/f, safe functional mob, benefit of larger movements, edema management, and progression with poc ND 02/20/17 0918 Active                      User Key     Initials Effective Dates Name Provider Type Discipline    ND 10/21/16 -  Any Whitaker, OTR/L Occupational Therapist OT                  OT Recommendation and Plan  Anticipated Equipment Needs At Discharge: bathing equipment, gait belt, dressing equipment  Anticipated Discharge Disposition: skilled nursing facility  Planned Therapy Interventions: activity intolerance, adaptive equipment training, ADL retraining, balance training, bed mobility training, edema management, energy conservation, home exercise program, strengthening, transfer training  Therapy Frequency: 3-5 times/wk  Plan of Care Review  Plan Of Care Reviewed With: patient  Progress: progress towards functional goals is fair  Outcome Summary/Follow up Plan: OT eval completed. Pt. cognitive status raises concern for accuracy of some information obtained by therapist. Pt. demonstrated flat affect throughout eval. Pt. required Min A to complete bed mobility.  Pt. completed sit to stand t/f from bed x3 occurances with MOD/MAX x2 and BUE support. Pt. required Max verbal and tactile cues to complete 2 side steps to HOB. Pt. educated on benefit of making gross movements however was never able to follow through with direction and required Max A from therapist to move BLE to take the side steps on side of bed. Pt. required Max A sitting EOB to don socks. Pt. cont to benefit from skilled OT due to decreased balance, decreased strength, decreased motor function, decreased independence in ADLs. Anticipated dc is SNF. 2/20/17 0910          OT Goals       02/20/17 0919          Bed Mobility OT LTG    Bed Mobility OT LTG, Date Established 02/20/17  -ND      Bed Mobility OT LTG, Time to Achieve by discharge  -ND      Bed Mobility OT LTG, Activity Type all bed mobility  -ND      Bed Mobility OT LTG, [place] Level independent  -ND      Transfer Training OT LTG    Transfer Training OT LTG, Date Established 02/20/17  -ND      Transfer Training OT LTG, Time to Achieve by discharge  -ND      Transfer Training OT LTG, Activity Type bed to chair /chair to bed;sit to stand/stand to sit;toilet  -ND      Transfer Training OT LTG, [place] Level moderate assist (50% patient effort)  -ND      Transfer Training OT LTG, Assist Device walker, rolling;commode, bedside  -ND      Strength OT LTG    Strength Goal OT LTG, Date Established 02/20/17  -ND      Strength Goal OT LTG, Time to Achieve by discharge  -ND      Strength Goal OT LTG, Measure to Achieve Pt. will complete BUE strengthening exercises to increase BUE strenghth to 5/5 for completing ADLs.   -ND      ADL OT LTG    ADL OT LTG, Date Established 02/20/17  -ND      ADL OT LTG, Time to Achieve by discharge  -ND      ADL OT LTG, Activity Type ADL skills  -ND      ADL OT LTG, [place] Level mod assist  -ND        User Key  (r) = Recorded By, (t) = Taken By, (c) = Cosigned By    Initials Name Provider Type    ND Any Whitaker,  OTR/L Occupational Therapist                Outcome Measures       02/20/17 0900 02/20/17 0807       How much help from another person do you currently need...    Turning from your back to your side while in flat bed without using bedrails?  (P)  2  -MC     Moving from lying on back to sitting on the side of a flat bed without bedrails?  (P)  2  -MC     Moving to and from a bed to a chair (including a wheelchair)?  (P)  1  -MC     Standing up from a chair using your arms (e.g., wheelchair, bedside chair)?  (P)  2  -MC     Climbing 3-5 steps with a railing?  (P)  1  -MC     To walk in hospital room?  (P)  1  -MC     AM-PAC 6 Clicks Score  (P)  9  -PB (r) MC (t)     How much help from another is currently needed...    Putting on and taking off regular lower body clothing? 2  -ND      Bathing (including washing, rinsing, and drying) 2  -ND      Toileting (which includes using toilet bed pan or urinal) 2  -ND      Putting on and taking off regular upper body clothing 2  -ND      Taking care of personal grooming (such as brushing teeth) 2  -ND      Eating meals 3  -ND      Score 13  -ND      Functional Assessment    Outcome Measure Options AM-PAC 6 Clicks Daily Activity (OT)  -ND (P)  AM-PAC 6 Clicks Basic Mobility (PT)  -       User Key  (r) = Recorded By, (t) = Taken By, (c) = Cosigned By    Initials Name Provider Type    PB Etlon Zavala, PT DPT Physical Therapist    ND Any Whitaker OTR/L Occupational Therapist    MAGALI Mcelroy, PT Student PT Student          Time Calculation:   OT Start Time: 0810  OT Stop Time: 0910  OT Time Calculation (min): 60 min    Therapy Charges for Today     Code Description Service Date Service Provider Modifiers Qty    73546955504 HC OT SELFCARE CURRENT 2/20/2017 TAMMIE Luna/L GO, CL 1    01848108246 HC OT SELFCARE PROJECTED 2/20/2017 TAMMIE Luna/L GO, CK 1    [id number] HC OT EVAL MOD COMPLEXITY 4 2/20/2017 TAMMIE Luna/L GO, KX 1          OT  G-codes  OT Functional Scales Options: AM-PAC 6 Clicks Daily Activity (OT)  Functional Limitation: Self care  Self Care Current Status (): At least 60 percent but less than 80 percent impaired, limited or restricted  Self Care Goal Status (): At least 40 percent but less than 60 percent impaired, limited or restricted    Any Whitaker, OTR/L  2/20/2017

## 2017-02-20 NOTE — PLAN OF CARE
Problem: Patient Care Overview (Adult)  Goal: Plan of Care Review  Outcome: Ongoing (interventions implemented as appropriate)    02/20/17 0932   Coping/Psychosocial Response Interventions   Plan Of Care Reviewed With patient   Outcome Evaluation   Outcome Summary/Follow up Plan PT eval completed. Pt reports of living environment and prior level of function may not be accurate due to confusion and decreased cognition. Pt completed bed mobility with set up, bed rails, HOB elevated, and [place] . Pt transfered sit<>stand with MaxAx2 and max cues. Pt side stepped toward HOB x2 steps with MaxA for step initiation and max cues. Pt would benefit from skilled PT intervention to address imparied mobility, decreased balance, decreased activity tolerance, and impaired motor coordination. Anticipated d/c is to SNF.         Problem: Inpatient Physical Therapy  Goal: Bed Mobility Goal LTG- PT  Outcome: Ongoing (interventions implemented as appropriate)    02/20/17 0932   Bed Mobility PT LTG   Bed Mobility PT LTG, Date Established 02/20/17   Bed Mobility PT LTG, Time to Achieve by discharge   Bed Mobility PT LTG, Activity Type all bed mobility   Bed Mobility PT LTG, [place] Level conditional independence   Bed Mobility PT Goal LTG, Assist Device bed rails       Goal: Transfer Training Goal 1 LTG- PT  Outcome: Ongoing (interventions implemented as appropriate)    02/20/17 0932   Transfer Training PT LTG   Transfer Training PT LTG, Date Established 02/20/17   Transfer Training PT LTG, Time to Achieve by discharge   Transfer Training PT LTG, Activity Type bed to chair /chair to bed;sit to stand/stand to sit   Transfer Training PT LTG, [place] Level minimum assist (75% patient effort);set up required   Transfer Training PT LTG, Assist Device walker, rolling       Goal: Gait Training Goal LTG- PT  Outcome: Ongoing (interventions implemented as appropriate)    02/20/17 0932   Gait Training PT LTG   Gait Training Goal PT LTG,  Date Established 02/20/17   Gait Training Goal PT LTG, Time to Achieve by discharge   Gait Training Goal PT LTG, [place] Level minimum assist (75% patient effort)   Gait Training Goal PT LTG, Assist Device walker, rolling   Gait Training Goal PT LTG, Distance to Achieve 15ft

## 2017-02-20 NOTE — PROGRESS NOTES
[facility] Medicine Services  INPATIENT PROGRESS NOTE    Length of Stay: 2  Date of Admission: 2/18/2017  Primary Care Physician: John Paul Salvador MD    Subjective   Chief Complaint: Increased weakness with decreased ability to ambulate 2-3 days prior to admission.  HPI   Presented to ED 2/18/17 with progressive weakness and immobility. History of Parkinson's disease followed by neurology. Needs right knee surgery. Unable to care for himself and caregiver is having difficulty caring for him.  Bare metal stent 9/23/16 and is currently on Plavix. BNP and d-dimer elevated on admission.    Currently lying in bed.  He was up in chair with physical therapy and occupational therapy earlier today.  He reports progressive weakness over the last 2-3 days.  He has been unable to use his cane at home to ambulate.  He has required assistance of walker.  He denies shortness of breath today.  He denies nausea, vomiting or abdominal pain.  Reports chronic edema right lower extremity due to needing right knee surgery.  Edema has lessened over the last 24 hours with diuretics.    Review of Systems   Constitutional: Positive for activity change (increased weakness past 3 days, needing walker for assistance). Negative for fever and unexpected weight change.   HENT: Negative for congestion.   Eyes: Negative for visual disturbance.   Respiratory: Positive for shortness of breath (×2 days) Now resolved. Negative for cough and wheezing.   Cardiovascular: Positive for leg swelling (Right lower extremity edema, much improved). Negative for chest pain and palpitations.   Gastrointestinal: Negative for abdominal distention.   Genitourinary: Negative for difficulty urinating.   Musculoskeletal: Positive for gait problem.   Skin: Negative for wound.   Neurological: Positive for weakness (Increasing weakness).   Psychiatric/Behavioral: Negative for agitation.   All pertinent negatives and positives are as  above. All other systems have been reviewed and are negative unless otherwise stated.     Objective    Temp:  [97.4 °F (36.3 °C)-98.1 °F (36.7 °C)] 98.1 °F (36.7 °C)  Heart Rate:  [58-82] 67  Resp:  [18-20] 18  BP: (124-154)/(55-77) 154/60  Physical Exam  Constitutional: He is oriented to person, place, and time. He appears well-developed and well-nourished.   HENT:   Head: Normocephalic and atraumatic.   Eyes: EOM are normal. Pupils are equal, round, and reactive to light.   Neck: Normal range of motion. Neck supple. No tracheal deviation present.   Cardiovascular: Normal rate, regular rhythm, normal heart sounds and intact distal pulses.   No murmur heard.  Pulmonary/Chest: Effort normal and breath sounds normal. He has no wheezes. He has no rales.   Abdominal: Soft. Bowel sounds are normal. He exhibits no distension.   Musculoskeletal: He exhibits edema trace (Right ankle and foot edema has improved past 24 hours).   TEDs bilateral lower extremities   Neurological: He is alert and oriented to person, place, and time.   Skin: Skin is warm and dry. No rash noted.   Psychiatric: He has a normal mood and affect.     Results Review:  I have reviewed the labs, radiology results, and diagnostic studies.    Laboratory Data:     Results from last 7 days  Lab Units 02/19/17  0200 02/18/17  1629   WBC 10*3/mm3 6.36 8.48   HEMOGLOBIN g/dL 9.5* 10.3*   HEMATOCRIT % 31.4* 34.1*   PLATELETS 10*3/mm3 265 274          Results from last 7 days  Lab Units 02/19/17  0200 02/18/17  1629   SODIUM mmol/L 140 141   POTASSIUM mmol/L 3.6 4.2   CHLORIDE mmol/L 104 103   TOTAL CO2 mmol/L 26.0 28.0   BUN mg/dL 22* 28*   CREATININE mg/dL 0.90 0.99   CALCIUM mg/dL 9.3 10.0   BILIRUBIN mg/dL  --  0.6   ALK PHOS U/L  --  109   ALT (SGPT) U/L  --  18   AST (SGOT) U/L  --  20   GLUCOSE mg/dL 101* 96     Ferritin [[id number]] (Abnormal) Collected: 02/19/17 0201      Lab Status: Final result Specimen: Blood Updated: 02/19/17 0308      Ferritin 16.40  (L) ng/mL      Iron Profile [[id number]] (Abnormal) Collected: 02/19/17 0201     Lab Status: Final result Specimen: Blood Updated: 02/19/17 0245      Iron 55 mcg/dL       TIBC 316 mcg/dL       Iron Saturation 17 (L) %      Radiology Data:   CTA chest with contrast 2/18/17 no pulmonary emboli some. No acute lung disease.     X-ray 1 view 2/18/17 no acute disease    Echo 2/19/17 EF 58.2%   · Left ventricular wall thickness is consistent with mild-to-moderate concentric hypertrophy.  · Left ventricular function is normal.  · Left ventricular diastolic dysfunction (grade I) consistent with impaired relaxation.  · Trace-to-mild aortic valve regurgitation is present.  · Trace tricuspid valve regurgitation is present. Estimated right ventricular systolic pressure from tricuspid regurgitation is mildly elevated (35-45 mmHg).  ·   Scheduled Meds    amantadine 100 mg Oral Q12H   aspirin 81 mg Oral Daily   carbidopa-levodopa 1 tablet Oral TID   carbidopa-levodopa CR 1 tablet Oral Nightly   clopidogrel 75 mg Oral Daily   donepezil 10 mg Oral Nightly   enoxaparin 40 mg Subcutaneous Daily   furosemide 40 mg Intravenous BID   lisinopril 20 mg Oral Daily   metoprolol tartrate 50 mg Oral Q12H   mirtazapine 15 mg Oral Nightly   pravastatin 20 mg Oral Nightly   tamsulosin 0.4 mg Oral Nightly       I have reviewed the patient current medications.     Assessment/Plan     Hospital Problem List     Acute systolic congestive heart failure        Assessment:  1.  Progressive weakness and fatigue likely related to Parkinson's disease.   2.  Parkinson's disease.   3.  Coronary artery disease s/p bare metal stent 9/23/17 on plavix  4.  Acute diastolic heart failure with elevated BNP 2120, EF 58.2% 2/19/17   5.  Hypertension.   6.  Dyslipidemia.   7.  Anemia Iron deficiency. Iron 55, saturation 17%, ferritin 16.4  8.  Abnormal UA trace leuk esterase, 3-5 WBCs, culture not indicated per microbiology lab.  9.  Elevated d-dimer with negative CTA for  pulmonary emboli  10.  Elevated proBNP secondary to acute diastolic heart failure    Plan:  1.  Echocardiogram 2/19/17 EF 58.2%.  2.  Plavix 75 mg orally daily s/p stent 9/23/17  3.  Lasix 40 IV for diuresis.  Will decrease dose to 20 mg orally daily home dose.  4.  Physical therapy and occupational therapy.  Patient has been up to chair today.  5.   assisting for discharge placement.  6.  [facility] has offered bed in 3 day inpatient stay on 2/21/17 if medically stable.  7.  IV Venofer 200mg daily ×2 days. Iron saturation 17%, ferritin 16.4.  8.  Neurology following.  No adjustment in Parkinson medications at this time.  9.  Home medications reviewed.  10.  TEDs bilateral lower extremities and Lovenox for DVT prophylaxis.  11.  Recommend to continue TEDs after discharge  12.  BMP in a.m.    The above documentation resulted from a face-to-face encounter by me Maddie JEREZ, [facility].      Discharge Planning: I expect patient to be discharged to [facility] nursing and rehabilitation tomorrow.    MERI Bautista   02/20/17   10:16 AM  This is late entry from encounter around 1810 on Feb 20  I personally evaluated and examined the patient in conjunction with MERI Perkins and agree with the assessment, treatment plan, and disposition of the patient as recorded by her. My history, exam, and further recommendations are:   Our conversation mostly revolve around his diagnosis of Parkinson.  He was diagnosed about 4 yrs ago but didn't have symptoms untill recently.  He said that its truck him hard.  Dr. Alberts is following along this conversation.  VS stable  NAD  Eating dinner.  CTA  Anticipating d/c to snf   Dimas Deng MD  02/21/17  9:34 AM

## 2017-02-20 NOTE — PLAN OF CARE
Problem: Patient Care Overview (Adult)  Goal: Plan of Care Review  Outcome: Ongoing (interventions implemented as appropriate)    02/20/17 0919   Coping/Psychosocial Response Interventions   Plan Of Care Reviewed With patient   Patient Care Overview   Progress progress towards functional goals is fair   Outcome Evaluation   Outcome Summary/Follow up Plan OT eval completed. Pt. cognitive status raises concern for accuracy of some information obtained by therapist. Pt. demonstrated flat affect throughout eval. Pt. required Min A to complete bed mobility. Pt. completed sit to stand t/f from bed x3 occurances with MOD/MAX x2 and BUE support. Pt. required Max verbal and tactile cues to complete 2 side steps to HOB. Pt. educated on benefit of making gross movements however was never able to follow through with direction and required Max A from therapist to move BLE to take the side steps on side of bed. Pt. required Max A sitting EOB to don socks. Pt. cont to benefit from skilled OT due to decreased balance, decreased strength, decreased motor function, decreased independence in ADLs. Anticipated dc is SNF. 2/20/17 0910         Problem: Inpatient Occupational Therapy  Goal: Bed Mobility Goal LTG- OT  Outcome: Ongoing (interventions implemented as appropriate)    02/20/17 0919   Bed Mobility OT LTG   Bed Mobility OT LTG, Date Established 02/20/17   Bed Mobility OT LTG, Time to Achieve by discharge   Bed Mobility OT LTG, Activity Type all bed mobility   Bed Mobility OT LTG, [place] Level independent       Goal: Transfer Training Goal 1 LTG- OT  Outcome: Ongoing (interventions implemented as appropriate)    02/20/17 0919   Transfer Training OT LTG   Transfer Training OT LTG, Date Established 02/20/17   Transfer Training OT LTG, Time to Achieve by discharge   Transfer Training OT LTG, Activity Type bed to chair /chair to bed;sit to stand/stand to sit;toilet   Transfer Training OT LTG, [place] Level moderate assist  (50% patient effort)   Transfer Training OT LTG, Assist Device walker, rolling;commode, bedside       Goal: Strength Goal LTG- OT  Outcome: Ongoing (interventions implemented as appropriate)    02/20/17 0919   Strength OT LTG   Strength Goal OT LTG, Date Established 02/20/17   Strength Goal OT LTG, Time to Achieve by discharge   Strength Goal OT LTG, Measure to Achieve Pt. will complete BUE strengthening exercises to increase BUE strenghth to 5/5 for completing ADLs.        Goal: ADL Goal LTG- OT  Outcome: Ongoing (interventions implemented as appropriate)    02/20/17 0919   ADL OT LTG   ADL OT LTG, Date Established 02/20/17   ADL OT LTG, Time to Achieve by discharge   ADL OT LTG, Activity Type ADL skills   ADL OT LTG, [place] Level mod assist

## 2017-02-20 NOTE — PLAN OF CARE
Problem: Patient Care Overview (Adult)  Goal: Plan of Care Review  Outcome: Ongoing (interventions implemented as appropriate)    02/20/17 0428   Coping/Psychosocial Response Interventions   Plan Of Care Reviewed With patient   Patient Care Overview   Progress no change   Outcome Evaluation   Outcome Summary/Follow up Plan Pt oriented X4 but will make inappropriate statements. Medicated X1 for leg pain with Tylenol with relief. Pt's groins are red and excoriated, scrotum is edematus and red. Incontinent care given and repositioned q2hrs. Pt awaiting NHP.       Goal: Adult Individualization and Mutuality  Outcome: Ongoing (interventions implemented as appropriate)  Goal: Discharge Needs Assessment  Outcome: Ongoing (interventions implemented as appropriate)    Problem: Cardiac: Heart Failure (Adult)  Goal: Signs and Symptoms of Listed Potential Problems Will be Absent or Manageable (Cardiac: Heart Failure)  Outcome: Ongoing (interventions implemented as appropriate)    Problem: Fall Risk (Adult)  Goal: Identify Related Risk Factors and Signs and Symptoms  Outcome: Ongoing (interventions implemented as appropriate)  Goal: Absence of Falls  Outcome: Ongoing (interventions implemented as appropriate)    Problem: Skin Integrity Impairment, Risk/Actual (Adult)  Goal: Identify Related Risk Factors and Signs and Symptoms  Outcome: Ongoing (interventions implemented as appropriate)  Goal: Skin Integrity/Wound Healing  Outcome: Ongoing (interventions implemented as appropriate)

## 2017-02-20 NOTE — PLAN OF CARE
Problem: Patient Care Overview (Adult)  Goal: Plan of Care Review  Outcome: Ongoing (interventions implemented as appropriate)    02/19/17 8018   Coping/Psychosocial Response Interventions   Plan Of Care Reviewed With patient   Patient Care Overview   Progress no change   Outcome Evaluation   Outcome Summary/Follow up Plan VSS, pt has low motivation for therapy, no further complaints or pain or SOA throughout this shift, will continue to monitor.        Goal: Adult Individualization and Mutuality  Outcome: Ongoing (interventions implemented as appropriate)  Goal: Discharge Needs Assessment  Outcome: Ongoing (interventions implemented as appropriate)    Problem: Cardiac: Heart Failure (Adult)  Goal: Signs and Symptoms of Listed Potential Problems Will be Absent or Manageable (Cardiac: Heart Failure)  Outcome: Ongoing (interventions implemented as appropriate)    Problem: Fall Risk (Adult)  Goal: Identify Related Risk Factors and Signs and Symptoms  Outcome: Ongoing (interventions implemented as appropriate)  Goal: Absence of Falls  Outcome: Ongoing (interventions implemented as appropriate)    Problem: Skin Integrity Impairment, Risk/Actual (Adult)  Goal: Identify Related Risk Factors and Signs and Symptoms  Outcome: Ongoing (interventions implemented as appropriate)  Goal: Skin Integrity/Wound Healing  Outcome: Ongoing (interventions implemented as appropriate)

## 2017-02-21 VITALS
TEMPERATURE: 98.1 F | HEIGHT: 72 IN | HEART RATE: 66 BPM | DIASTOLIC BLOOD PRESSURE: 68 MMHG | BODY MASS INDEX: 32.93 KG/M2 | OXYGEN SATURATION: 96 % | RESPIRATION RATE: 18 BRPM | WEIGHT: 243.13 LBS | SYSTOLIC BLOOD PRESSURE: 153 MMHG

## 2017-02-21 PROBLEM — I50.33 ACUTE ON CHRONIC DIASTOLIC CONGESTIVE HEART FAILURE (HCC): Status: ACTIVE | Noted: 2017-02-18

## 2017-02-21 LAB
ANION GAP SERPL CALCULATED.3IONS-SCNC: 8 MMOL/L (ref 4–13)
BUN BLD-MCNC: 35 MG/DL (ref 5–21)
BUN/CREAT SERPL: 35 (ref 7–25)
CALCIUM SPEC-SCNC: 9.5 MG/DL (ref 8.4–10.4)
CHLORIDE SERPL-SCNC: 102 MMOL/L (ref 98–110)
CO2 SERPL-SCNC: 30 MMOL/L (ref 24–31)
CREAT BLD-MCNC: 1 MG/DL (ref 0.5–1.4)
GFR SERPL CREATININE-BSD FRML MDRD: 74 ML/MIN/1.73
GLUCOSE BLD-MCNC: 103 MG/DL (ref 70–100)
POTASSIUM BLD-SCNC: 3.8 MMOL/L (ref 3.5–5.3)
SODIUM BLD-SCNC: 140 MMOL/L (ref 135–145)

## 2017-02-21 PROCEDURE — 97535 SELF CARE MNGMENT TRAINING: CPT

## 2017-02-21 PROCEDURE — 80048 BASIC METABOLIC PNL TOTAL CA: CPT | Performed by: NURSE PRACTITIONER

## 2017-02-21 PROCEDURE — 97110 THERAPEUTIC EXERCISES: CPT

## 2017-02-21 PROCEDURE — 25010000002 IRON SUCROSE PER 1 MG: Performed by: NURSE PRACTITIONER

## 2017-02-21 RX ORDER — FERROUS SULFATE 325(65) MG
325 TABLET ORAL
Qty: 30 TABLET | Refills: 0
Start: 2017-02-21

## 2017-02-21 RX ORDER — LISINOPRIL 20 MG/1
20 TABLET ORAL DAILY
Qty: 1 TABLET | Refills: 0
Start: 2017-02-21

## 2017-02-21 RX ORDER — ACETAMINOPHEN 325 MG/1
650 TABLET ORAL EVERY 6 HOURS PRN
Qty: 1 TABLET | Refills: 0
Start: 2017-02-21

## 2017-02-21 RX ORDER — PRAVASTATIN SODIUM 20 MG
20 TABLET ORAL NIGHTLY
Qty: 1 TABLET | Refills: 0
Start: 2017-02-21 | End: 2017-02-21 | Stop reason: HOSPADM

## 2017-02-21 RX ADMIN — METOPROLOL TARTRATE 50 MG: 50 TABLET ORAL at 08:51

## 2017-02-21 RX ADMIN — ASPIRIN 81 MG: 81 TABLET ORAL at 08:51

## 2017-02-21 RX ADMIN — LISINOPRIL 20 MG: 20 TABLET ORAL at 08:51

## 2017-02-21 RX ADMIN — FUROSEMIDE 20 MG: 20 TABLET ORAL at 08:51

## 2017-02-21 RX ADMIN — AMANTADINE HYDROCHLORIDE 100 MG: 100 TABLET ORAL at 08:51

## 2017-02-21 RX ADMIN — IRON SUCROSE 200 MG: 20 INJECTION, SOLUTION INTRAVENOUS at 08:07

## 2017-02-21 RX ADMIN — CARBIDOPA AND LEVODOPA 1 TABLET: 25; 100 TABLET ORAL at 08:51

## 2017-02-21 NOTE — DISCHARGE PLACEMENT REQUEST
"Miley Max (71 y.o. Male)     Date of Birth Social Security Number Address Home Phone MRN    1945  [id number]  [place] [address] [phone] [id number]    Judaism Marital Status          [employer] Single       Admission Date Admission Type Admitting Provider Attending Provider Department, Room/Bed    2/18/17 Emergency Dimas Deng MD [place], Glenn Riego, MD [facility] 4B, 407/1    Discharge Date Discharge Disposition Discharge Destination         Skilled Nursing Facility (DC - External)             Attending Provider: Dimas Deng MD     Allergies:  Penicillins    Isolation:  None   Infection:  None   Code Status:  FULL    Ht:  72\" (182.9 cm)   Wt:  243 lb 2 oz (110 kg)    Admission Cmt:  None   Principal Problem:  Acute on chronic diastolic congestive heart failure [I50.33]                 Active Insurance as of 2/18/2017     Primary Coverage     Payor Plan Insurance Group Employer/Plan Group    MEDICARE MEDICARE A & B      Payor Plan Address Payor Plan Phone Number Effective From Effective To    [address] [phone] 9/1/2010     [place] [id number]       Subscriber Name Subscriber Birth Date Member ID       MILEY MAX 1945 [id number]                 Emergency Contacts      (Rel.) Home Phone Work Phone Mobile Phone    Gloria Molina (Friend) [phone] -- --               History & Physical      H&P signed by Shiraz Horta MD at 2/19/2017  2:05 AM              cc:  TAYLOR PRITCHARD M.D.    DATE OF ADMISSION: 02/18/2017    TIME: 10:21 p.m.    PRIMARY CARE PHYSICIAN: Taylor Pritchard MD    NEUROLOGIST: Tyron Alberts MD    HISTORY OF PRESENT ILLNESS: Mr. Max is a 71-year-old  male who presents to [facility] due to a multiplicity of complaints including fatigue, malaise, lethargy, generalized weakness and immobility. Mr. Max has a history of Parkinson's disease with recent " worsening of his condition. He is no longer able to care for himself. He presents now requesting evaluation and treatment of his worsening Parkinson's disease. Of note, he is seen by Dr. Tyron Alberts of the neurology service.     REVIEW OF SYSTEMS: Otherwise unremarkable from a cardiovascular, pulmonary, gastrointestinal, genitourinary, neurologic, psychiatric, metabolic and constitutional standpoint except as noted. He has had generalized fatigue and weakness. He has had no definite fevers, chills, or sweats. His appetite is good. His weight is stable. He has had no chest pain, chest palpitations, shortness of breath, lower extremity swelling, orthopnea, cough, wheezing, or hemoptysis. He has had no abdominal pain, nausea, vomiting, diarrhea, or constipation. He has had no dysphagia, odynophagia, hematemesis, hematochezia, or melena. He has had no flank pain, pelvic pain, hematuria, or dysuria. He has had no skin rashes or arthralgias or myalgias. He has had no headache, confusion, memory deficits or loss of consciousness. He has had no changes in his vision or hearing. He has had no acute motor or sensory deficits except as noted above. He is unable to ambulate independently.     PAST MEDICAL HISTORY:  1. Hypertension.   2. Dyslipidemia.   3. Coronary artery disease.   4. Parkinson's disease.   5. Enlarged prostate gland.   6. Osteoarthritis of the knees.     PAST SURGICAL HISTORY:  1. Status post multiple cardiac stent deployments. He had a drug-eluting stent placed in 09/2016.   2. Status post tonsillectomy.     ALLERGIES: PENICILLIN.     HOME MEDICATIONS:  1. These are uncertain. Medical record indicates he uses amantadine 100 mg p.o. b.i.d.   2. Aspirin 81 mg p.o. daily.   3. Glucosamine 1500 mg p.o. daily.   4. Sinemet 25/100, take 1 p.o. t.i.d. and 50/200, take 1 p.o. at bedtime.   5. Klonopin 0.5 mg p.o. at bedtime.   6. Plavix 75 mg p.o. daily.   7. Coenzyme Q 100 mg p.o. daily.   8. Colace 250 mg p.o.  daily.   9. Aricept 10 mg p.o. at bedtime.   10. Lasix 20 mg p.o. daily.   11. Lisinopril 20 mg p.o. daily.   12. Krill oil 1 p.o. daily.   13. Lopressor 50 mg p.o. b.i.d.   14. Myrbetriq 50 mg p.o. daily.   15. Remeron 15 mg p.o. at bedtime.   16. Nitroglycerin sublingual tablets 0.4 mg p.o. p.r.n. for chest pain.   17. Pravachol 40 mg p.o. at bedtime.   18. Flomax 0.4 mg p.o. at bedtime.     SOCIAL HISTORY: Significant for being a resident of [place], [place]. He lives with his ex-wife. He is . He has a son and daughter in good health. He is disabled due to Parkinson's disease. He has a high school education. He has no history of tobacco, alcohol or drug use. 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 He has no recent history of travel outside this region.     He designates his son, Darrick Max JR, to serve as a surrogate for healthcare matters should such become necessary. His son is also POWER OF .     The patient is a FULL CODE.     FAMILY HISTORY: Significant for having a brother  due to cancer of uncertain type. He has 3 elderly surviving sisters described as being in fair health, although he is not able to elaborate. Both parents are  due to natural causes of uncertain type.     PHYSICAL EXAMINATION:    VITAL SIGNS: Temperature is 97, pulse 80, respirations are 18 and unlabored, blood pressure is 140/62, O2 saturation is 96% breathing ambient air. Weight is 240 pounds.     GENERAL: This is a 71-year-old  male appearing younger than his documented age. He is resting comfortably in bed. He is in no apparent distress. He is interactive and cooperative. He proves to be a fairly good historian.     HEAD AND NECK: Essentially unremarkable except as noted. I see no signs of acute trauma. Eyes, nose, and throat are grossly unremarkable. Sclerae are clear. There is no discharge from the nostrils. Mucous membranes are moist.     NECK: Supple. He has no cervical or clavicular  adenopathy. He has no carotid bruits. There are no masses of the head or neck. Neck veins do not appear pathologically distended.     CARDIAC: Reveals S1 and S2 with a regular rhythm. He has no murmurs, rubs, or gallops.     LUNGS: Reveals bilateral breath sounds are clear to auscultation throughout. He has no rales, wheezes, or rhonchi.     ABDOMEN: Reveals bowel sounds to be present. His abdomen is nontender, nondistended and soft. He is obese.     EXTREMITIES: No lower extremity edema, erythema or calf tenderness.     NEUROLOGIC: Reveals the patient to be awake and alert. He seems oriented to person, place, time and situation. Cranial nerves II-XII are grossly intact. He exhibits no definite acute focal motor or sensory deficits. He exhibits no bradykinesia. He has cogwheeling of the upper extremities. He is able to stand with assistance, but is unable to ambulate.     PSYCHIATRIC: Reveals his mood to be stable. Affect seems appropriate. Thought processes are organized in that he is able to answer questions appropriately and provide a coherent history. Speech is fluent, but measured and slow. There is no flight of ideas. There are no obvious short-term or long-term memory deficits. There or no obvious detectable short-term or long-term memory deficits.     DIAGNOSTIC DATA: Complete metabolic panel is essentially unremarkable, except for BUN of 28.     CBC demonstrates a white blood cell count of 8.48, hemoglobin 10.3, hematocrit 34.1, platelet count of 274,000.     Prothrombin time and PTT are unremarkable.     D-dimer is 3.59.     Troponin level is unremarkable.     Magnesium level is 1.9.     Urinalysis demonstrates specific gravity of 1.010 with a pH of 6.0. His urine contains a trace amount of leukocyte esterase and 3-5 white blood cells per high-power field.     ProBNP is 2120.     Chest x-ray demonstrates no acute abnormalities.     CT of the chest demonstrates no pulmonary embolism or other acute disease.      EKG demonstrates sinus rhythm of 81 beats.     IMPRESSION:  1. Progressive weakness and fatigue likely related to advancing Parkinson's disease.   2. Parkinson's disease.   3. Coronary artery disease.   4. Acute congestive heart failure due to diastolic dysfunction.   5. Hypertension.   6. Dyslipidemia.   7. Anemia.   8. Urinary tract infection.    PLAN: At this time, Mr. Max will be admitted to [facility] for further evaluation and treatment. His admitting diagnoses are as noted. His condition at this time is judged to be stable. He will be placed on telemetry.     I have asked the nursing staff to obtain vital signs per protocol. He will be confined to bedrest. His allergy to PENICILLIN is noted. I have asked the nursing staff to monitor input and output. Daily weights will be obtained. He will be maintained on a regular diet. IV fluids will be saline locked. Oxygen will be used as needed to maintain his O2 saturation greater than 92%. He is a FULL CODE. Fall precautions are to be instituted.     I will consult the neurology service.     INITIAL ADMITTING MEDICATIONS:   1. Amantadine 100 mg p.o. b.i.d.   2. Aspirin 81 mg p.o. daily.   3. Sinemet 25/100 p.o. t.i.d. and 500/200, take 1 p.o. at bedtime.   4. Plavix 75 mg p.o. daily.   5. Aricept 10 mg p.o. at bedtime.   6. Lovenox 40 mg subcutaneous daily.   7. Lasix 40 mg IV b.i.d.   8. Lisinopril 20 mg p.o. daily.   9. Metoprolol 50 mg p.o. q.12 h.   10. Remeron 15 mg p.o. at bedtime.   11. Pravachol 20 mg p.o. at bedtime.   12. Flomax 0.4 mg p.o. at bedtime.   13. Levaquin 500 mg IV daily.   14. Tylenol 650 mg p.o. q.6 h. p.r.n. for fever and/or discomfort.   15. DuoNeb 1 unit q.4 h. p.r.n. for shortness of breath.   16. Nitroglycerin sublingual tablets 0.4 mg p.o. p.r.n. for chest pain.   17. Zofran 4 mg IV q.6 h. p.r.n. for nausea and vomiting.     I will obtain followup laboratory studies and a cardiac echo in the morning.      I will  continue to follow Mr. Max closely through the night pending return of the hospitalist team in the morning. The nursing staff may call should they have any questions or concerns. Please refer to the medical record for additional information, orders and/or comments.        Shiraz Horta M.D.  SHARONA/[id number]  D:  02/18/2017 23:35:11(Eastern Time)  T:  02/19/2017 00:18:22(Eastern Time)  Voice ID:  [id number]/Document ID:  [id number]       Electronically signed by Shiraz Horta MD at 2/19/2017  2:05 AM           Discharge Summary      Dimas Deng MD at 2/21/2017  6:21 AM              [facility] Medicine Services  DISCHARGE SUMMARY       Date of Admission: 2/18/2017  Date of Discharge:  2/21/2017  Primary Care Physician: John Paul Salvador MD    Discharge Diagnoses:  Hospital Problem List     * (Principal)Acute on chronic diastolic congestive heart failure    Parkinson's disease        Discharge diagnoses   1. Progressive weakness and fatigue likely related to Parkinson's disease.   2. Acute diastolic heart failure with elevated BNP 2120, EF 58.2% 2/19/17   3. Parkinson's disease.   4. Coronary artery disease s/p bare metal stent 9/23/17 on plavix  5. Hypertension.   6. Dyslipidemia.   7. Anemia Iron deficiency. Iron 55, saturation 17%, ferritin 16.4  8. Abnormal UA trace leuk esterase, 3-5 WBCs, culture not indicated per microbiology lab.  9. Elevated d-dimer with negative CTA for pulmonary emboli  10. Elevated proBNP secondary to acute diastolic heart failure  Presenting Problem/History of Present Illness:  Acute systolic congestive heart failure [I50.21]  Acute systolic congestive heart failure [I50.21]     Chief Complaint on Day of Discharge: No complaints ready to go to [facility] to get therapy  History of Present Illness on Day of Discharge:   He is lying in bed.  He was transferred from sitting to standing with maximum assist ×2 with physical therapist yesterday.  He  took 2-3 steps.  He denies nausea, vomiting or abdominal pain.  He denies chest pain or palpitations.  He denies shortness of breath.  He reports edema right lower extremity improved.  He is ready to go to [facility] for ongoing physical therapy and occupational therapy.    Consults: Dr. Ortega Alberts, neurology    Procedures Performed: None    Pertinent Test Results:   Laboratory Data:      Results from last 7 days  Lab Units 02/19/17  0200 02/18/17  1629   WBC 10*3/mm3 6.36 8.48   HEMOGLOBIN g/dL 9.5* 10.3*   HEMATOCRIT % 31.4* 34.1*   PLATELETS 10*3/mm3 265 274          Results from last 7 days  Lab Units 02/21/17  0456 02/19/17  0200 02/18/17  1629   SODIUM mmol/L 140 140 141   POTASSIUM mmol/L 3.8 3.6 4.2   CHLORIDE mmol/L 102 104 103   TOTAL CO2 mmol/L 30.0 26.0 28.0   BUN mg/dL 35* 22* 28*   CREATININE mg/dL 1.00 0.90 0.99   CALCIUM mg/dL 9.5 9.3 10.0   BILIRUBIN mg/dL  --   --  0.6   ALK PHOS U/L  --   --  109   ALT (SGPT) U/L  --   --  18   AST (SGOT) U/L  --   --  20   GLUCOSE mg/dL 103* 101* 96     Ferritin [[id number]] (Abnormal) Collected: 02/19/17 0201      Lab Status: Final result Specimen: Blood Updated: 02/19/17 0308      Ferritin 16.40 (L) ng/mL      Iron Profile [[id number]] (Abnormal) Collected: 02/19/17 0201     Lab Status: Final result Specimen: Blood Updated: 02/19/17 0245      Iron 55 mcg/dL       TIBC 316 mcg/dL       Iron Saturation 17 (L) %      Vitamin B12 [[id number]] (Normal) Collected: 02/19/17 0201     Lab Status: Final result Specimen: Blood Updated: 02/19/17 0338      Vitamin B-12 289 pg/mL      Folate [[id number]] Collected: 02/19/17 0201     Lab Status: Final result Specimen: Blood Updated: 02/19/17 0338      Folate 6.69 ng/mL      TSH [[id number]] (Normal) Collected: 02/19/17 0201     Lab Status: Final result Specimen: Blood Updated: 02/19/17 0304      TSH 1.440 mIU/mL      Troponin [[id number]] (Normal) Collected: 02/19/17 0200     Lab Status: Final result Specimen: Blood Updated: 02/19/17  0240      Troponin I 0.015 ng/mL      Lab Status: Final result Specimen: Blood Updated: 02/19/17 0241       Total Cholesterol 98 (L) mg/dL       Triglycerides 94 mg/dL       HDL Cholesterol 31 (L) mg/dL       LDL Cholesterol  45 mg/dL       LDL/HDL Ratio 1.55      Troponin [[id number]] (Normal) Collected: 02/18/17 2127     Lab Status: Final result Specimen: Blood Updated: 02/18/17 2157      Troponin I <0.012 ng/mL      Magnesium [[id number]] (Normal) Collected: 02/18/17 2127     Lab Status: Final result Specimen: Blood Updated: 02/18/17 2145      Magnesium 1.9 mg/dL      Urinalysis With / Culture If Indicated [[id number]] (Abnormal) Collected: 02/18/17 2040     Lab Status: Final result Specimen: Urine from Urine, Clean Catch Updated: 02/18/17 2056      Color, UA Yellow       Appearance, UA Clear       pH, UA 6.0       Specific Gravity, UA 1.010       Glucose, UA Negative       Ketones, UA Negative       Bilirubin, UA Negative       Blood, UA Negative       Protein, UA Negative       Leuk Esterase, UA Trace (A)       Nitrite, UA Negative       Urobilinogen, UA 1.0 E.U./dL      Urinalysis, Microscopic Only [[id number]] (Abnormal) Collected: 02/18/17 2040     Lab Status: Final result Specimen: Urine from Urine, Clean Catch Updated: 02/18/17 2056      RBC, UA None Seen /HPF       WBC, UA 3-5 (A) /HPF       Bacteria, UA Trace (A) /HPF       Squamous Epithelial Cells, UA None Seen /HPF       Hyaline Casts, UA None Seen /LPF       Methodology Automated Microscopy      D-dimer, Quantitative [[id number]] (Abnormal) Collected: 02/18/17 1629      Lab Status: Final result Specimen: Blood from Arm, Left Updated: 02/18/17 1659      D-Dimer, Quantitative 3.59 (H) mg/L (FEU)      Narrative:       Reference Range is 0-0.50 mg/L FEU. However, results <0.50 mg/L FEU tends to rule out DVT or PE. Results >0.50 mg/L FEU are not useful in predicting absence or presence of DVT or PE.     BNP [[id number]] (Abnormal) Collected: 02/18/17 1629     Lab  Status: Final result Specimen: Blood from Arm, Left Updated: 02/18/17 1700      proBNP 2120.0 (H) pg/mL      aPTT [[id number]] (Normal) Collected: 02/18/17 1629     Lab Status: Final result Specimen: Blood from Arm, Left Updated: 02/18/17 1659      PTT 29.2 seconds      Protime-INR [[id number]] (Normal) Collected: 02/18/17 1629     Lab Status: Final result Specimen: Blood from Arm, Left Updated: 02/18/17 1659      Protime 13.2 Seconds       INR 0.97      Lactic Acid, Plasma [[id number]] (Normal) Collected: 02/18/17 1629     Lab Status: Final result Specimen: Blood from Arm, Left Updated: 02/18/17 1650      Lactate 0.9 mmol/L      Radiology Data:   CTA chest with contrast 2/18/17 no pulmonary emboli some. No acute lung disease.      X-ray 1 view 2/18/17 no acute disease     Echo 2/19/17 EF 58.2%   · Left ventricular wall thickness is consistent with mild-to-moderate concentric hypertrophy.  · Left ventricular function is normal.  · Left ventricular diastolic dysfunction (grade I) consistent with impaired relaxation.  · Trace-to-mild aortic valve regurgitation is present.  · Trace tricuspid valve regurgitation is present. Estimated right ventricular systolic pressure from tricuspid regurgitation is mildly elevated (35-45 mmHg).    Hospital Course  Patient is a 71 y.o. male presented  to [facility] ED 2/18/17 with progressive weakness and immobility. He has history of Parkinson's disease  followed by neurology.  He reports worsening weakness. He also is in need of right knee surgery. He has been unable to care for himself and caregiver is having difficulty caring for him.  He had bare metal stent 9/23/16 and is currently on Plavix. BNP 2120, d-dimer 3.59.  Chest x-ray no acute disease.  CTA chest was performed due to elevated d-dimer.  No pulmonary embolism.  No acute lung disease noted.  He received Lasix 40 mg IV in the ED.    He was admitted to the telemetry floor under the hospitalist service with progressive  weakness and fatigue, acute diastolic heart failure with elevated proBNP.  He was started on IV Lasix for diuresis.  Echocardiogram 2/19 revealed EF 58.2%.  Normal left ventricular function.  Diastolic dysfunction grade 1.  He diuresed with IV lasix and was placed back on oral Lasix home dose.  Lungs clear.  Edema right lower extremity resolved.  TEDs were placed.    He was seen in consultation by Dr. Ortega Alberts from neurology for Parkinson's disease.  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  Patient denied tremors.  Patient's complaint was ambulating difficulties that worsened over the last 3-4 days.  [facility]  Per Dr. Alberts, his neurological exam showed no signs of tremor and no signs of tone suggesting that he is optimally managed with his medications.  He recommends physical therapy and continued movement.  He was seen in consultation by physical therapy.  Patient was able to assist from bed to chair and take 2-3 steps.  He needs ongoing physical therapy and occupational therapy due to lack of movement long-term.  A bed has been offered at [facility] and rehabilitation.  He will be discharged to [facility] and rehabilitation today for ongoing physical therapy and occupational therapy.    He has chronic anemia.  Iron profile revealed iron saturation 17%.  Ferritin 16.4.  He received IV Venofer and will be placed on oral iron at the time of discharge.  Otherwise, there have been no adjustments in medications during hospital stay.  Hypertension stable on current home medications.    On 2/21/17 he is suitable for discharge and admission to [facility] and rehabilitation for ongoing physical therapy and occupational therapy due to decreased activity and decreased ability to ambulate.    Physical Exam on Discharge:  Visit Vitals   • /56 (BP Location: Left arm, Patient Position:  "Lying)   • Pulse 66   • Temp 97.9 °F (36.6 °C) (Temporal Artery )   • Resp 20   • Ht 72\" (182.9 cm)   • Wt 243 lb 2 oz (110 kg)   • SpO2 94%   • BMI 32.97 kg/m2     Physical Exam  Constitutional: He is oriented to person, place, and time. He appears well-developed and well-nourished.   HENT:   Head: Normocephalic and atraumatic.   Eyes: EOM are normal. Pupils are equal, round, and reactive to light.   Neck: Normal range of motion. Neck supple. No tracheal deviation present.   Cardiovascular: Normal rate, regular rhythm, normal heart sounds and intact distal pulses.   No murmur heard.  Pulmonary/Chest: Effort normal and breath sounds normal. He has no wheezes. He has no rales.   Abdominal: Soft. Bowel sounds are normal. He exhibits no distension.   Musculoskeletal: He exhibits edema trace (Right ankle and foot almost resolved with YONI).   TEDs bilateral lower extremities   Neurological: He is alert and oriented to person, place, and time.   Skin: Skin is warm and dry. No rash noted.   Psychiatric: He has a normal mood and affect.     Condition on Discharge:  Stable     Discharge Disposition: To [facility]    Discharge Diet:   Diet Instructions     Advance Diet As Tolerated                 As tolerated    Activity at Discharge:   Activity Instructions     Other Instructions (Specify)       TEDS bilateral extremities   Physical therapy and occupational therapy twice daily for progressive acitivity.             As tolerated. Needs progressive physical therapy with walker as tolerated twice daily. Occupational therapy twice daily    Discharge Care Plan / Instructions:   1.  He designates his son, Darrick Max , to serve as a surrogate for healthcare matters should such become necessary. His son is also POWER OF .   2.  The patient is a FULL CODE.   3.  Physical therapy twice daily with progressive activity.  4.  Occupational therapy twice daily.  5.  TEDs bilateral lower " extremities.    Discharge Medications:   Darrick Max   Home Medication Instructions LINA:[id number]    Printed on:02/21/17 [id number]   Medication Information                      acetaminophen (TYLENOL) 325 MG tablet  Take 2 tablets by mouth Every 6 (Six) Hours As Needed for mild pain (1-3) or fever.             amantadine (SYMMETREL) 100 MG capsule  Take 1 capsule by mouth 2 (Two) Times a Day.             aspirin 81 MG EC tablet  Take 81 mg by mouth Daily.             Boswellia-Glucosamine-Vit D (GLUCOSAMINE COMPLEX PO)  Take 1,500 mg by mouth daily with breakfast.             carbidopa-levodopa (SINEMET)  MG per tablet  Take 1 tablet by mouth 3 (Three) Times a Day.             carbidopa-levodopa CR (SINEMET CR)  MG per CR tablet  Take 1 tablet by mouth Every Night.             clonazePAM (KlonoPIN) 0.5 MG tablet  Take 1 tablet by mouth every night.             clopidogrel (PLAVIX) 75 MG tablet  Take 1 tablet by mouth daily.             coenzyme Q10 100 MG capsule  Take 100 mg by mouth daily.             docusate sodium (COLACE) 250 MG capsule  Take 250 mg by mouth daily.             donepezil (ARICEPT) 10 MG tablet  Take 10 mg by mouth every night.             ferrous sulfate (FERROUSUL) 325 (65 FE) MG tablet  Take 1 tablet by mouth Daily With Breakfast.             furosemide (LASIX) 20 MG tablet  Take 20 mg by mouth Daily.             lisinopril (PRINIVIL,ZESTRIL) 20 MG tablet  Take 1 tablet by mouth Daily.             MEGARED OMEGA-3 KRILL OIL PO  Take  by mouth every night.             metoprolol tartrate (LOPRESSOR) 50 MG tablet  Take 50 mg by mouth 2 (two) times a day.             Mirabegron ER (MYRBETRIQ) 50 MG tablet sustained-release 24 hour  Take 50 mg by mouth Daily.             mirtazapine (REMERON) 30 MG tablet  Take 15 mg by mouth Every Night. Takes half nightly             nitroglycerin (NITROSTAT) 0.4 MG SL tablet  Place 1 tablet under the tongue every 5 (five) minutes as needed for  chest pain. Take no more than 3 doses in 15 minutes.             pravastatin (PRAVACHOL) 40 MG tablet  Take 20 mg by mouth every night.             tamsulosin (FLOMAX) 0.4 MG capsule 24 hr capsule  Take 1 capsule by mouth Every Night.               Follow-up Appointments:   1.  He will be followed by physician in charge at [facility] during stay.  2.  [facility] Paul [facility].  3.  Keep next scheduled appointment with neurology, MERI Licea    Test Results Pending at Discharge: none    The above documentation resulted from a face-to-face encounter by me Maddie JEREZ, [facility].    MERI Bautista  02/21/17  7:22 AM    Time:  This discharge process required 42 minutes for completion     Plan discussed with Dr. Deng and patient.    Time spent in face-to-face evaluation, chart review, planning and education 42 minutes.  Please note that portions of this note may have been completed with a voice recognition program. Efforts were made to edit the dictations, but occasionally words are mistranscribed.    I personally evaluated  the patient in conjunction withMERI Perkins and agree with the assessment, treatment plan, and disposition of the patient as recorded by her. I felt patient is appropriate for discharge.     Electronically signed by Dimas Deng MD at 2/21/2017  9:38 AM

## 2017-02-21 NOTE — DISCHARGE SUMMARY
[facility]  DISCHARGE SUMMARY       Date of Admission: 2/18/2017  Date of Discharge:  2/21/2017  Primary Care Physician: John Paul Salvador MD    Discharge Diagnoses:  Hospital Problem List     * (Principal)Acute on chronic diastolic congestive heart failure    Parkinson's disease        Discharge diagnoses   1. Progressive weakness and fatigue likely related to Parkinson's disease.   2. Acute diastolic heart failure with elevated BNP 2120, EF 58.2% 2/19/17   3. Parkinson's disease.   4. Coronary artery disease status post bare metal stent 9/23/17 on plavix  5. Hypertension.   6. Dyslipidemia.   7. Anemia Iron deficiency. Iron 55, saturation 17%, ferritin 16.4  8. Abnormal urinalysis trace leukocyte esterase, 3-5 WBCs, culture not indicated per microbiology lab.  9. Elevated d-dimer with negative chest ct angiogram for pulmonary emboli  10. Elevated proBNP secondary to acute diastolic heart failure  Presenting Problem/History of Present Illness:  Acute systolic congestive heart failure [I50.21]  Acute systolic congestive heart failure [I50.21]     Chief Complaint on Day of Discharge: No complaints ready to go to [facility] to get therapy  History of Present Illness on Day of Discharge:   He is lying in bed.  He was transferred from sitting to standing with maximum assist ×2 with physical therapist yesterday.  He took 2-3 steps.  He denies nausea, vomiting or abdominal pain.  He denies chest pain or palpitations.  He denies shortness of breath.  He reports edema right lower extremity improved.  He is ready to go to [facility] for ongoing physical therapy and occupational therapy.    Consults: Dr. Ortega Alberts, neurology    Procedures Performed: None    Pertinent Test Results:   Laboratory Data:      Results from last 7 days  Lab Units 02/19/17  0200 02/18/17  1629   WBC 10*3/mm3 6.36 8.48   HEMOGLOBIN g/dL 9.5* 10.3*   HEMATOCRIT % 31.4* 34.1*   PLATELETS 10*3/mm3  265 274          Results from last 7 days  Lab Units 02/21/17  0456 02/19/17  0200 02/18/17  1629   SODIUM mmol/L 140 140 141   POTASSIUM mmol/L 3.8 3.6 4.2   CHLORIDE mmol/L 102 104 103   TOTAL CO2 mmol/L 30.0 26.0 28.0   BUN mg/dL 35* 22* 28*   CREATININE mg/dL 1.00 0.90 0.99   CALCIUM mg/dL 9.5 9.3 10.0   BILIRUBIN mg/dL  --   --  0.6   ALK PHOS U/L  --   --  109   ALT (SGPT) U/L  --   --  18   AST (SGOT) U/L  --   --  20   GLUCOSE mg/dL 103* 101* 96     Ferritin [[id number]] (Abnormal) Collected: 02/19/17 0201      Lab Status: Final result Specimen: Blood Updated: 02/19/17 0308      Ferritin 16.40 (L) ng/mL      Iron Profile [[id number]] (Abnormal) Collected: 02/19/17 0201     Lab Status: Final result Specimen: Blood Updated: 02/19/17 0245      Iron 55 mcg/dL       TIBC 316 mcg/dL       Iron Saturation 17 (L) %      Vitamin B12 [[id number]] (Normal) Collected: 02/19/17 0201     Lab Status: Final result Specimen: Blood Updated: 02/19/17 0338      Vitamin B-12 289 pg/mL      Folate [[id number]] Collected: 02/19/17 0201     Lab Status: Final result Specimen: Blood Updated: 02/19/17 0338      Folate 6.69 ng/mL      TSH [[id number]] (Normal) Collected: 02/19/17 0201     Lab Status: Final result Specimen: Blood Updated: 02/19/17 0304      TSH 1.440 mIU/mL      Troponin [[id number]] (Normal) Collected: 02/19/17 0200     Lab Status: Final result Specimen: Blood Updated: 02/19/17 0240      Troponin I 0.015 ng/mL      Lab Status: Final result Specimen: Blood Updated: 02/19/17 0241       Total Cholesterol 98 (L) mg/dL       Triglycerides 94 mg/dL       HDL Cholesterol 31 (L) mg/dL       LDL Cholesterol  45 mg/dL       LDL/HDL Ratio 1.55      Troponin [[id number]] (Normal) Collected: 02/18/17 2127     Lab Status: Final result Specimen: Blood Updated: 02/18/17 2157      Troponin I <0.012 ng/mL      Magnesium [[id number]] (Normal) Collected: 02/18/17 2127     Lab Status: Final result Specimen: Blood Updated: 02/18/17 2145      Magnesium 1.9  mg/dL      Urinalysis With / Culture If Indicated [[id number]] (Abnormal) Collected: 02/18/17 2040     Lab Status: Final result Specimen: Urine from Urine, Clean Catch Updated: 02/18/17 2056      Color, UA Yellow       Appearance, UA Clear       pH, UA 6.0       Specific Gravity, UA 1.010       Glucose, UA Negative       Ketones, UA Negative       Bilirubin, UA Negative       Blood, UA Negative       Protein, UA Negative       Leuk Esterase, UA Trace (A)       Nitrite, UA Negative       Urobilinogen, UA 1.0 E.U./dL      Urinalysis, Microscopic Only [[id number]] (Abnormal) Collected: 02/18/17 2040     Lab Status: Final result Specimen: Urine from Urine, Clean Catch Updated: 02/18/17 2056      RBC, UA None Seen /HPF       WBC, UA 3-5 (A) /HPF       Bacteria, UA Trace (A) /HPF       Squamous Epithelial Cells, UA None Seen /HPF       Hyaline Casts, UA None Seen /LPF       Methodology Automated Microscopy      D-dimer, Quantitative [[id number]] (Abnormal) Collected: 02/18/17 1629      Lab Status: Final result Specimen: Blood from Arm, Left Updated: 02/18/17 1659      D-Dimer, Quantitative 3.59 (H) mg/L (FEU)      Narrative:       Reference Range is 0-0.50 mg/L FEU. However, results <0.50 mg/L FEU tends to rule out DVT or PE. Results >0.50 mg/L FEU are not useful in predicting absence or presence of DVT or PE.     BNP [[id number]] (Abnormal) Collected: 02/18/17 1629     Lab Status: Final result Specimen: Blood from Arm, Left Updated: 02/18/17 1700      proBNP 2120.0 (H) pg/mL      aPTT [[id number]] (Normal) Collected: 02/18/17 1629     Lab Status: Final result Specimen: Blood from Arm, Left Updated: 02/18/17 1659      PTT 29.2 seconds      Protime-INR [[id number]] (Normal) Collected: 02/18/17 1629     Lab Status: Final result Specimen: Blood from Arm, Left Updated: 02/18/17 1659      Protime 13.2 Seconds       INR 0.97      Lactic Acid, Plasma [[id number]] (Normal) Collected: 02/18/17 1629     Lab Status: Final result Specimen: Blood  from Arm, Left Updated: 02/18/17 1650      Lactate 0.9 mmol/L      Radiology Data:   CTA chest with contrast 2/18/17 no pulmonary emboli some. No acute lung disease.      X-ray 1 view 2/18/17 no acute disease     Echo 2/19/17 EF 58.2%   · Left ventricular wall thickness is consistent with mild-to-moderate concentric hypertrophy.  · Left ventricular function is normal.  · Left ventricular diastolic dysfunction (grade I) consistent with impaired relaxation.  · Trace-to-mild aortic valve regurgitation is present.  · Trace tricuspid valve regurgitation is present. Estimated right ventricular systolic pressure from tricuspid regurgitation is mildly elevated (35-45 mmHg).    Hospital Course  Patient is a 71 y.o. male presented  to [facility] ED 2/18/17 with progressive weakness and immobility. He has history of Parkinson's disease  followed by neurology.  He reports worsening weakness. He also is in need of right knee surgery. He has been unable to care for himself and caregiver is having difficulty caring for him.  He had bare metal stent 9/23/16 and is currently on Plavix. BNP 2120, d-dimer 3.59.  Chest x-ray no acute disease.  CTA chest was performed due to elevated d-dimer.  No pulmonary embolism.  No acute lung disease noted.  He received Lasix 40 mg IV in the ED.    He was admitted to the telemetry floor under the hospitalist service with progressive weakness and fatigue, acute diastolic heart failure with elevated proBNP.  He was started on IV Lasix for diuresis.  Echocardiogram 2/19 revealed EF 58.2%.  Normal left ventricular function.  Diastolic dysfunction grade 1.  He diuresed with IV lasix and was placed back on oral Lasix home dose.  Lungs clear.  Edema right lower extremity resolved.  TEDs were placed.    He was seen in consultation by Dr. Ortega Alberts from neurology for Parkinson's disease.  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  Patient denied  "tremors.  Patient's complaint was ambulating difficulties that worsened over the last 3-4 days.  [facility]  Per Dr. Alberts, his neurological exam showed no signs of tremor and no signs of tone suggesting that he is optimally managed with his medications.  He recommends physical therapy and continued movement.  He was seen in consultation by physical therapy.  Patient was able to assist from bed to chair and take 2-3 steps.  He needs ongoing physical therapy and occupational therapy due to lack of movement long-term.  A bed has been offered at [facility].  He will be discharged to [facility] today for ongoing physical therapy and occupational therapy.    He has chronic anemia.  Iron profile revealed iron saturation 17%.  Ferritin 16.4.  He received IV Venofer and will be placed on oral iron at the time of discharge.  Otherwise, there have been no adjustments in medications during hospital stay.  Hypertension stable on current home medications.    On 2/21/17 he is suitable for discharge and admission to [facility] for ongoing physical therapy and occupational therapy due to decreased activity and decreased ability to ambulate.    Physical Exam on Discharge:  Visit Vitals   • /56 (BP Location: Left arm, Patient Position: Lying)   • Pulse 66   • Temp 97.9 °F (36.6 °C) (Temporal Artery )   • Resp 20   • Ht 72\" (182.9 cm)   • Wt 243 lb 2 oz (110 kg)   • SpO2 94%   • BMI 32.97 kg/m2     Physical Exam  Constitutional: He is oriented to person, place, and time. He appears well-developed and well-nourished.   HENT:   Head: Normocephalic and atraumatic.   Eyes: EOM are normal. Pupils are equal, round, and reactive to light.   Neck: Normal range of motion. Neck supple. No tracheal deviation present.   Cardiovascular: Normal rate, regular rhythm, normal heart sounds and intact distal " pulses.   No murmur heard.  Pulmonary/Chest: Effort normal and breath sounds normal. He has no wheezes. He has no rales.   Abdominal: Soft. Bowel sounds are normal. He exhibits no distension.   Musculoskeletal: He exhibits edema trace (Right ankle and foot almost resolved with YONI).   TEDs bilateral lower extremities   Neurological: He is alert and oriented to person, place, and time.   Skin: Skin is warm and dry. No rash noted.   Psychiatric: He has a normal mood and affect.     Condition on Discharge:  Stable     Discharge Disposition: To [facility]    Discharge Diet:   Diet Instructions     Advance Diet As Tolerated                 As tolerated    Activity at Discharge:   Activity Instructions     Other Instructions (Specify)       TEDS bilateral extremities   Physical therapy and occupational therapy twice daily for progressive acitivity.             As tolerated. Needs progressive physical therapy with walker as tolerated twice daily. Occupational therapy twice daily    Discharge Care Plan / Instructions:   1.  He designates his son, Darrick Max JR, to serve as a surrogate for healthcare matters should such become necessary. His son is also POWER OF .   2.  The patient is a FULL CODE.   3.  Physical therapy twice daily with progressive activity.  4.  Occupational therapy twice daily.  5.  TEDs bilateral lower extremities.    Discharge Medications:   Darrick Max   Home Medication Instructions LINA:[id number]    Printed on:02/21/17 5207   Medication Information                      acetaminophen (TYLENOL) 325 MG tablet  Take 2 tablets by mouth Every 6 (Six) Hours As Needed for mild pain (1-3) or fever.             amantadine (SYMMETREL) 100 MG capsule  Take 1 capsule by mouth 2 (Two) Times a Day.             aspirin 81 MG EC tablet  Take 81 mg by mouth Daily.             Boswellia-Glucosamine-Vit D (GLUCOSAMINE COMPLEX PO)  Take 1,500 mg by mouth daily with breakfast.              carbidopa-levodopa (SINEMET)  MG per tablet  Take 1 tablet by mouth 3 (Three) Times a Day.             carbidopa-levodopa CR (SINEMET CR)  MG per CR tablet  Take 1 tablet by mouth Every Night.             clonazePAM (KlonoPIN) 0.5 MG tablet  Take 1 tablet by mouth every night.             clopidogrel (PLAVIX) 75 MG tablet  Take 1 tablet by mouth daily.             coenzyme Q10 100 MG capsule  Take 100 mg by mouth daily.             docusate sodium (COLACE) 250 MG capsule  Take 250 mg by mouth daily.             donepezil (ARICEPT) 10 MG tablet  Take 10 mg by mouth every night.             ferrous sulfate (FERROUSUL) 325 (65 FE) MG tablet  Take 1 tablet by mouth Daily With Breakfast.             furosemide (LASIX) 20 MG tablet  Take 20 mg by mouth Daily.             lisinopril (PRINIVIL,ZESTRIL) 20 MG tablet  Take 1 tablet by mouth Daily.             MEGARED OMEGA-3 KRILL OIL PO  Take  by mouth every night.             metoprolol tartrate (LOPRESSOR) 50 MG tablet  Take 50 mg by mouth 2 (two) times a day.             Mirabegron ER (MYRBETRIQ) 50 MG tablet sustained-release 24 hour  Take 50 mg by mouth Daily.             mirtazapine (REMERON) 30 MG tablet  Take 15 mg by mouth Every Night. Takes half nightly             nitroglycerin (NITROSTAT) 0.4 MG SL tablet  Place 1 tablet under the tongue every 5 (five) minutes as needed for chest pain. Take no more than 3 doses in 15 minutes.             pravastatin (PRAVACHOL) 40 MG tablet  Take 20 mg by mouth every night.             tamsulosin (FLOMAX) 0.4 MG capsule 24 hr capsule  Take 1 capsule by mouth Every Night.               Follow-up Appointments:   1.  He will be followed by physician in charge at [facility] during stay.  2.  [facility] Paul [facility].  3.  Keep next scheduled appointment with neurology, MERI Licea    Test Results Pending at Discharge: none    The above documentation resulted from a  face-to-face encounter by me Maddie JEREZ, [facility].    MERI Bautista  02/21/17  7:22 AM    Time:  This discharge process required 42 minutes for completion     Plan discussed with Dr. Deng and patient.    Time spent in face-to-face evaluation, chart review, planning and education 42 minutes.  Please note that portions of this note may have been completed with a voice recognition program. Efforts were made to edit the dictations, but occasionally words are mistranscribed.    I personally evaluated  the patient in conjunction withMERI Perkins and agree with the assessment, treatment plan, and disposition of the patient as recorded by her. I felt patient is appropriate for discharge.      I am instructed to edit the diagnoses #4 and # 8 where abbreviations where addressed.

## 2017-02-21 NOTE — PLAN OF CARE
Problem: Inpatient Occupational Therapy  Goal: ADL Goal LTG- OT  Outcome: Unable to achieve outcome(s) by discharge Date Met:  02/21/17 02/20/17 0919 02/21/17 1309   ADL OT LTG   ADL OT LTG, Date Established 02/20/17 --    ADL OT LTG, Time to Achieve by discharge --    ADL OT LTG, Activity Type ADL skills --    ADL OT LTG, [place] Level mod assist --    ADL OT LTG, Date Goal Reviewed --  02/21/17   ADL OT LTG, Outcome --  goal not met   ADL OT LTG, Reason Goal Not Met --  discharged from facility

## 2017-02-21 NOTE — THERAPY DISCHARGE NOTE
Acute Care - Occupational Therapy Discharge Summary  [facility]     Patient Name: Darrick Max  : 1945  MRN: [id number]    Today's Date: 2017  Onset of Illness/Injury or Date of Surgery Date: 17    Date of Referral to OT: 17  Referring Physician: Dr. Alberts      Admit Date: 2017        OT Recommendation and Plan    Visit Dx:    ICD-10-CM ICD-9-CM   1. Acute systolic congestive heart failure I50.21 428.21     428.0   2. Decreased activities of daily living (ADL) Z78.9 V49.89   3. Impaired functional mobility, balance, gait, and endurance Z74.09 V49.89               Time Calculation- OT       17 0937 17 0908       Time Calculation- OT    OT Start Time 0914  -ND      OT Stop Time 945  -ND      OT Time Calculation (min) 31 min  -ND      OT Received On 17  -ND      OT Goal Re-Cert Due Date 17  -ND --  -ND       User Key  (r) = Recorded By, (t) = Taken By, (c) = Cosigned By    Initials Name Provider Type    ND Any Whitaker, OTR/L Occupational Therapist                  OT Goals       17 1309 17 1308 17 0919    Bed Mobility OT LTG    Bed Mobility OT LTG, Date Established   17  -ND    Bed Mobility OT LTG, Time to Achieve   by discharge  -ND    Bed Mobility OT LTG, Activity Type   all bed mobility  -ND    Bed Mobility OT LTG, [place] Level   independent  -ND    Bed Mobility OT LTG, Date Goal Reviewed  17  -TR     Bed Mobility OT LTG, Outcome  goal not met  -TR     Bed Mobility OT LTG, Reason Goal Not Met  discharged from facility  -TR     Transfer Training OT LTG    Transfer Training OT LTG, Date Established   17  -ND    Transfer Training OT LTG, Time to Achieve   by discharge  -ND    Transfer Training OT LTG, Activity Type   bed to chair /chair to bed;sit to stand/stand to sit;toilet  -ND    Transfer Training OT LTG, [place] Level   moderate assist (50% patient effort)  -ND    Transfer Training OT LTG, Assist  Device   walker, rolling;commode, bedside  -ND    Transfer Training OT LTG, Date Goal Reviewed  02/21/17  -TR     Transfer Training OT LTG, Outcome  goal not met  -TR     Transfer Training OT LTG, Reason Goal Not Met  discharged from facility  -TR     Strength OT LTG    Strength Goal OT LTG, Date Established   02/20/17  -ND    Strength Goal OT LTG, Time to Achieve   by discharge  -ND    Strength Goal OT LTG, Measure to Achieve   Pt. will complete BUE strengthening exercises to increase BUE strenghth to 5/5 for completing ADLs.   -ND    Strength Goal OT LTG, Date Goal Reviewed  02/21/17  -TR     Strength Goal OT LTG, Outcome  goal not met  -TR     Strength Goal OT LTG, Reason Goal Not Met  discharged from facility  -TR     ADL OT LTG    ADL OT LTG, Date Established   02/20/17  -ND    ADL OT LTG, Time to Achieve   by discharge  -ND    ADL OT LTG, Activity Type   ADL skills  -ND    ADL OT LTG, [place] Level   mod assist  -ND    ADL OT LTG, Date Goal Reviewed 02/21/17  -TR      ADL OT LTG, Outcome goal not met  -TR      ADL OT LTG, Reason Goal Not Met discharged from facility  -TR        User Key  (r) = Recorded By, (t) = Taken By, (c) = Cosigned By    Initials Name Provider Type    TR Ying Worthy, OTR/L Occupational Therapist    ND Any Whitaker, OTR/L Occupational Therapist                Outcome Measures       02/21/17 0900 02/20/17 0900 02/20/17 0807    How much help from another person do you currently need...    Turning from your back to your side while in flat bed without using bedrails?   2  -PB (r) MC (t) PB (c)    Moving from lying on back to sitting on the side of a flat bed without bedrails?   2  -PB (r) MC (t) PB (c)    Moving to and from a bed to a chair (including a wheelchair)?   1  -PB (r) MC (t) PB (c)    Standing up from a chair using your arms (e.g., wheelchair, bedside chair)?   2  -PB (r) MC (t) PB (c)    Climbing 3-5 steps with a railing?   1  -PB (r) MC (t) PB (c)    To walk in  hospital room?   1  -PB (r) MC (t) PB (c)    AM-PAC 6 Clicks Score   9  -PB (r) MC (t)    How much help from another is currently needed...    Putting on and taking off regular lower body clothing? 2  -ND 2  -ND     Bathing (including washing, rinsing, and drying) 2  -ND 2  -ND     Toileting (which includes using toilet bed pan or urinal) 2  -ND 2  -ND     Putting on and taking off regular upper body clothing 2  -ND 2  -ND     Taking care of personal grooming (such as brushing teeth) 2  -ND 2  -ND     Eating meals 4  -ND 3  -ND     Score 14  -ND 13  -ND     Functional Assessment    Outcome Measure Options  AM-PAC 6 Clicks Daily Activity (OT)  -ND AM-PAC 6 Clicks Basic Mobility (PT)  -PB (r) MC (t) PB (c)      User Key  (r) = Recorded By, (t) = Taken By, (c) = Cosigned By    Initials Name Provider Type    PB Elton Zavala, PT DPT Physical Therapist    NATHALIA Whitaker, OTR/L Occupational Therapist    MAGALI Mcelroy, PT Student PT Student              OT Discharge Summary  Anticipated Discharge Disposition: skilled nursing facility  Reason for Discharge: Discharge from facility  Outcomes Achieved: Unable to make functional progress toward goals at this time  Discharge Destination: SNF      Ying Worthy, OTR/L  2/21/2017

## 2017-02-21 NOTE — PLAN OF CARE
Problem: Patient Care Overview (Adult)  Goal: Plan of Care Review  Outcome: Ongoing (interventions implemented as appropriate)    02/21/17 0936   Coping/Psychosocial Response Interventions   Plan Of Care Reviewed With patient   Patient Care Overview   Progress progress toward functional goals as expected   Outcome Evaluation   Outcome Summary/Follow up Plan OT tx completed. Pt. required Min A for bed mob with HOB elevated and bed rail. Pt. sat EOB approx 20 min and completed BUE strengthening exercises with supervision and mod verbal cues to maintain upright sitting position. Pt. washed face sitting EOB with set up. 2/21/17 8836

## 2017-02-21 NOTE — THERAPY DISCHARGE NOTE
Acute Care - Physical Therapy Discharge Summary  [facility]       Patient Name: Darrick Max  : 1945  MRN: [id number]    Today's Date: 2017  Onset of Illness/Injury or Date of Surgery Date: 17    Date of Referral to PT: 17  Referring Physician: Dr. Alberts      Admit Date: 2017      PT Recommendation and Plan    Visit Dx:    ICD-10-CM ICD-9-CM   1. Acute systolic congestive heart failure I50.21 428.21     428.0   2. Decreased activities of daily living (ADL) Z78.9 V49.89   3. Impaired functional mobility, balance, gait, and endurance Z74.09 V49.89             Outcome Measures       17 0900 17 0900 17 0807    How much help from another person do you currently need...    Turning from your back to your side while in flat bed without using bedrails?   2  -PB (r) MC (t) PB (c)    Moving from lying on back to sitting on the side of a flat bed without bedrails?   2  -PB (r) MC (t) PB (c)    Moving to and from a bed to a chair (including a wheelchair)?   1  -PB (r) MC (t) PB (c)    Standing up from a chair using your arms (e.g., wheelchair, bedside chair)?   2  -PB (r) MC (t) PB (c)    Climbing 3-5 steps with a railing?   1  -PB (r) MC (t) PB (c)    To walk in hospital room?   1  -PB (r) MC (t) PB (c)    AM-PAC 6 Clicks Score   9  -PB (r) MC (t)    How much help from another is currently needed...    Putting on and taking off regular lower body clothing? 2  -ND 2  -ND     Bathing (including washing, rinsing, and drying) 2  -ND 2  -ND     Toileting (which includes using toilet bed pan or urinal) 2  -ND 2  -ND     Putting on and taking off regular upper body clothing 2  -ND 2  -ND     Taking care of personal grooming (such as brushing teeth) 2  -ND 2  -ND     Eating meals 4  -ND 3  -ND     Score 14  -ND 13  -ND     Functional Assessment    Outcome Measure Options  AM-PAC 6 Clicks Daily Activity (OT)  -ND AM-PAC 6 Clicks Basic Mobility (PT)  -PB (r) MC (t) PB (c)      User  Key  (r) = Recorded By, (t) = Taken By, (c) = Cosigned By    Initials Name Provider Type    PB Elton Zavala, PT DPT Physical Therapist    NATHALIA Whitaker, OTR/L Occupational Therapist    MAGALI Mcelroy, PT Student PT Student                      IP PT Goals       02/21/17 1350 02/20/17 0932       Bed Mobility PT LTG    Bed Mobility PT LTG, Date Established  02/20/17  -PB (r) MC (t) PB (c)     Bed Mobility PT LTG, Time to Achieve  by discharge  -PB (r) MC (t) PB (c)     Bed Mobility PT LTG, Activity Type  all bed mobility  -PB (r) MC (t) PB (c)     Bed Mobility PT LTG, Dolores Level  conditional independence  -PB (r) MC (t) PB (c)     Bed Mobility PT Goal  LTG, Assist Device  bed rails  -PB (r) MC (t) PB (c)     Bed Mobility PT LTG, Date Goal Reviewed 02/21/17  -      Bed Mobility PT LTG, Outcome goal not met  -      Bed Mobility PT LTG, Reason Goal Not Met discharged from [facility]  -      Transfer Training PT LTG    Transfer Training PT LTG, Date Established  02/20/17  -PB (r) MC (t) PB (c)     Transfer Training PT LTG, Time to Achieve  by discharge  -PB (r) MC (t) PB (c)     Transfer Training PT LTG, Activity Type  bed to chair /chair to bed;sit to stand/stand to sit  -PB (r) MC (t) PB (c)     Transfer Training PT LTG, Dolores Level  minimum assist (75% patient effort);set up required  -PB (r) MC (t) PB (c)     Transfer Training PT LTG, Assist Device  walker, rolling  -PB (r) MC (t) PB (c)     Transfer Training PT  LTG, Date Goal Reviewed 02/21/17  -      Transfer Training PT LTG, Outcome goal not met  -      Transfer Training PT LTG, Reason Goal Not Met discharged from facility  -      Gait Training PT LTG    Gait Training Goal PT LTG, Date Established  02/20/17  -PB (r) MC (t) PB (c)     Gait Training Goal PT LTG, Time to Achieve  by discharge  -PB (r) MC (t) PB (c)     Gait Training Goal PT LTG, Dolores Level  minimum assist (75% patient effort)  -PB (r) MC (t) PB (c)      Gait Training Goal PT LTG, Assist Device  walker, rolling  -PB (r) MC (t) PB (c)     Gait Training Goal PT LTG, Distance to Achieve  15ft   -PB (r) MC (t) PB (c)     Gait Training Goal PT LTG, Date Goal Reviewed 02/21/17  -      Gait Training Goal PT LTG, Outcome goal not met  -      Gait Training Goal PT LTG, Reason Goal Not Met discharged from facility  -        User Key  (r) = Recorded By, (t) = Taken By, (c) = Cosigned By    Initials Name Provider Type    REBECCA Millan, SHEELA Physical Therapy Assistant    PB Elton Zavala, PT DPT Physical Therapist    MAGALI Mcelroy, PT Student PT Student              PT Discharge Summary  Anticipated Discharge Disposition: skilled nursing facility  Reason for Discharge: Discharge from facility  Outcomes Achieved: Unable to make functional progress toward goals at this time  Discharge Destination: SNF      Melodie Millan PTA   2/21/2017

## 2017-02-21 NOTE — PLAN OF CARE
Problem: Patient Care Overview (Adult)  Goal: Plan of Care Review  Outcome: Ongoing (interventions implemented as appropriate)    02/21/17 0332   Coping/Psychosocial Response Interventions   Plan Of Care Reviewed With patient   Patient Care Overview   Progress no change   Outcome Evaluation   Outcome Summary/Follow up Plan Pt confused at times. Incontinent of B&B. Can use urinal at times. Turn q 2 hrs. Anticipate d/c to [facility] today.          Problem: Cardiac: Heart Failure (Adult)  Goal: Signs and Symptoms of Listed Potential Problems Will be Absent or Manageable (Cardiac: Heart Failure)  Outcome: Ongoing (interventions implemented as appropriate)    Problem: Fall Risk (Adult)  Goal: Identify Related Risk Factors and Signs and Symptoms  Outcome: Ongoing (interventions implemented as appropriate)  Goal: Absence of Falls  Outcome: Ongoing (interventions implemented as appropriate)    Problem: Skin Integrity Impairment, Risk/Actual (Adult)  Goal: Identify Related Risk Factors and Signs and Symptoms  Outcome: Ongoing (interventions implemented as appropriate)  Goal: Skin Integrity/Wound Healing  Outcome: Ongoing (interventions implemented as appropriate)

## 2017-02-21 NOTE — PAYOR COMM NOTE
"[facility]  TOYA RAMIREZ RN [id number]  FAX [phone]  Miley Max (71 y.o. Male)     Date of Birth Social Security Number Address Home Phone MRN    1945  [id number]   [place] [address] [phone] [id number]    Druze Marital Status          [employer] Single       Admission Date Admission Type Admitting Provider Attending Provider Department, Room/Bed    2/18/17 Emergency Dimas Deng MD [place], Glenn Riego, MD [facility] 4B, 407/1    Discharge Date Discharge Disposition Discharge Destination         Skilled Nursing Facility (DC - External)             Attending Provider: Dimas Deng MD     Allergies:  Penicillins    Isolation:  None   Infection:  None   Code Status:  FULL    Ht:  72\" (182.9 cm)   Wt:  243 lb 2 oz (110 kg)    Admission Cmt:  None   Principal Problem:  Acute on chronic diastolic congestive heart failure [I50.33]                 Active Insurance as of 2/18/2017     Primary Coverage     Payor Plan Insurance Group Employer/Plan Group    MEDICARE MEDICARE A & B      Payor Plan Address Payor Plan Phone Number Effective From Effective To    [address] [phone] 9/1/2010     [place] [address]       Subscriber Name Subscriber Birth Date Member ID       MILEY MAX 1945 [id number]                 Emergency Contacts      (Rel.) Home Phone Work Phone Mobile Phone    Gloria Molina (Friend) [phone] -- --               Discharge Summary      Dimas Deng MD at 2/21/2017  6:21 AM              [facility] Medicine Services  DISCHARGE SUMMARY       Date of Admission: 2/18/2017  Date of Discharge:  2/21/2017  Primary Care Physician: John Paul Salvador MD    Discharge Diagnoses:  Hospital Problem List     * (Principal)Acute on chronic diastolic congestive heart failure    Parkinson's disease        Discharge diagnoses   1. Progressive " weakness and fatigue likely related to Parkinson's disease.   2. Acute diastolic heart failure with elevated BNP 2120, EF 58.2% 2/19/17   3. Parkinson's disease.   4. Coronary artery disease s/p bare metal stent 9/23/17 on plavix  5. Hypertension.   6. Dyslipidemia.   7. Anemia Iron deficiency. Iron 55, saturation 17%, ferritin 16.4  8. Abnormal UA trace leuk esterase, 3-5 WBCs, culture not indicated per microbiology lab.  9. Elevated d-dimer with negative CTA for pulmonary emboli  10. Elevated proBNP secondary to acute diastolic heart failure  Presenting Problem/History of Present Illness:  Acute systolic congestive heart failure [I50.21]  Acute systolic congestive heart failure [I50.21]     Chief Complaint on Day of Discharge: No complaints ready to go to [facility] to get therapy  History of Present Illness on Day of Discharge:   He is lying in bed.  He was transferred from sitting to standing with maximum assist ×2 with physical therapist yesterday.  He took 2-3 steps.  He denies nausea, vomiting or abdominal pain.  He denies chest pain or palpitations.  He denies shortness of breath.  He reports edema right lower extremity improved.  He is ready to go to [facility] for ongoing physical therapy and occupational therapy.    Consults: Dr. Ortega Alberts, neurology    Procedures Performed: None    Pertinent Test Results:   Laboratory Data:      Results from last 7 days  Lab Units 02/19/17  0200 02/18/17  1629   WBC 10*3/mm3 6.36 8.48   HEMOGLOBIN g/dL 9.5* 10.3*   HEMATOCRIT % 31.4* 34.1*   PLATELETS 10*3/mm3 265 274          Results from last 7 days  Lab Units 02/21/17  0456 02/19/17  0200 02/18/17  1629   SODIUM mmol/L 140 140 141   POTASSIUM mmol/L 3.8 3.6 4.2   CHLORIDE mmol/L 102 104 103   TOTAL CO2 mmol/L 30.0 26.0 28.0   BUN mg/dL 35* 22* 28*   CREATININE mg/dL 1.00 0.90 0.99   CALCIUM mg/dL 9.5 9.3 10.0   BILIRUBIN mg/dL  --   --  0.6   ALK PHOS U/L  --   --  109   ALT (SGPT) U/L  --   --  18   AST (SGOT) U/L   --   --  20   GLUCOSE mg/dL 103* 101* 96     Ferritin [[id number]] (Abnormal) Collected: 02/19/17 0201      Lab Status: Final result Specimen: Blood Updated: 02/19/17 0308      Ferritin 16.40 (L) ng/mL      Iron Profile [[id number]] (Abnormal) Collected: 02/19/17 0201     Lab Status: Final result Specimen: Blood Updated: 02/19/17 0245      Iron 55 mcg/dL       TIBC 316 mcg/dL       Iron Saturation 17 (L) %      Vitamin B12 [[id number]] (Normal) Collected: 02/19/17 0201     Lab Status: Final result Specimen: Blood Updated: 02/19/17 0338      Vitamin B-12 289 pg/mL      Folate [[id number]] Collected: 02/19/17 0201     Lab Status: Final result Specimen: Blood Updated: 02/19/17 0338      Folate 6.69 ng/mL      TSH [[id number]] (Normal) Collected: 02/19/17 0201     Lab Status: Final result Specimen: Blood Updated: 02/19/17 0304      TSH 1.440 mIU/mL      Troponin [[id number]] (Normal) Collected: 02/19/17 0200     Lab Status: Final result Specimen: Blood Updated: 02/19/17 0240      Troponin I 0.015 ng/mL      Lab Status: Final result Specimen: Blood Updated: 02/19/17 0241       Total Cholesterol 98 (L) mg/dL       Triglycerides 94 mg/dL       HDL Cholesterol 31 (L) mg/dL       LDL Cholesterol  45 mg/dL       LDL/HDL Ratio 1.55      Troponin [[id number]] (Normal) Collected: 02/18/17 2127     Lab Status: Final result Specimen: Blood Updated: 02/18/17 2157      Troponin I <0.012 ng/mL      Magnesium [[id number]] (Normal) Collected: 02/18/17 2127     Lab Status: Final result Specimen: Blood Updated: 02/18/17 2145      Magnesium 1.9 mg/dL      Urinalysis With / Culture If Indicated [[id number]] (Abnormal) Collected: 02/18/17 2040     Lab Status: Final result Specimen: Urine from Urine, Clean Catch Updated: 02/18/17 2056      Color, UA Yellow       Appearance, UA Clear       pH, UA 6.0       Specific Gravity, UA 1.010       Glucose, UA Negative       Ketones, UA Negative       Bilirubin, UA Negative       Blood, UA Negative       Protein, UA  Negative       Leuk Esterase, UA Trace (A)       Nitrite, UA Negative       Urobilinogen, UA 1.0 E.U./dL      Urinalysis, Microscopic Only [[id number]] (Abnormal) Collected: 02/18/17 2040     Lab Status: Final result Specimen: Urine from Urine, Clean Catch Updated: 02/18/17 2056      RBC, UA None Seen /HPF       WBC, UA 3-5 (A) /HPF       Bacteria, UA Trace (A) /HPF       Squamous Epithelial Cells, UA None Seen /HPF       Hyaline Casts, UA None Seen /LPF       Methodology Automated Microscopy      D-dimer, Quantitative [[id number]] (Abnormal) Collected: 02/18/17 1629      Lab Status: Final result Specimen: Blood from Arm, Left Updated: 02/18/17 1659      D-Dimer, Quantitative 3.59 (H) mg/L (FEU)      Narrative:       Reference Range is 0-0.50 mg/L FEU. However, results <0.50 mg/L FEU tends to rule out DVT or PE. Results >0.50 mg/L FEU are not useful in predicting absence or presence of DVT or PE.     BNP [[id number]] (Abnormal) Collected: 02/18/17 1629     Lab Status: Final result Specimen: Blood from Arm, Left Updated: 02/18/17 1700      proBNP 2120.0 (H) pg/mL      aPTT [[id number]] (Normal) Collected: 02/18/17 1629     Lab Status: Final result Specimen: Blood from Arm, Left Updated: 02/18/17 1659      PTT 29.2 seconds      Protime-INR [[id number]] (Normal) Collected: 02/18/17 1629     Lab Status: Final result Specimen: Blood from Arm, Left Updated: 02/18/17 1659      Protime 13.2 Seconds       INR 0.97      Lactic Acid, Plasma [[id number]] (Normal) Collected: 02/18/17 1629     Lab Status: Final result Specimen: Blood from Arm, Left Updated: 02/18/17 1650      Lactate 0.9 mmol/L      Radiology Data:   CTA chest with contrast 2/18/17 no pulmonary emboli some. No acute lung disease.      X-ray 1 view 2/18/17 no acute disease     Echo 2/19/17 EF 58.2%   · Left ventricular wall thickness is consistent with mild-to-moderate concentric hypertrophy.  · Left ventricular function is normal.  · Left ventricular diastolic dysfunction  (grade I) consistent with impaired relaxation.  · Trace-to-mild aortic valve regurgitation is present.  · Trace tricuspid valve regurgitation is present. Estimated right ventricular systolic pressure from tricuspid regurgitation is mildly elevated (35-45 mmHg).    Hospital Course  Patient is a 71 y.o. male presented  to [facility] ED 2/18/17 with progressive weakness and immobility. He has history of Parkinson's disease  followed by neurology.  He reports worsening weakness. He also is in need of right knee surgery. He has been unable to care for himself and caregiver is having difficulty caring for him.  He had bare metal stent 9/23/16 and is currently on Plavix. BNP 2120, d-dimer 3.59.  Chest x-ray no acute disease.  CTA chest was performed due to elevated d-dimer.  No pulmonary embolism.  No acute lung disease noted.  He received Lasix 40 mg IV in the ED.    He was admitted to the telemetry floor under the hospitalist service with progressive weakness and fatigue, acute diastolic heart failure with elevated proBNP.  He was started on IV Lasix for diuresis.  Echocardiogram 2/19 revealed EF 58.2%.  Normal left ventricular function.  Diastolic dysfunction grade 1.  He diuresed with IV lasix and was placed back on oral Lasix home dose.  Lungs clear.  Edema right lower extremity resolved.  TEDs were placed.    He was seen in consultation by Dr. Ortega Alberts from neurology for Parkinson's disease.  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  Patient denied tremors.  Patient's complaint was ambulating difficulties that worsened over the last 3-4 days.  [facility]  Per Dr. Alberts, his neurological exam showed no signs of tremor and no signs of tone suggesting that he is optimally managed with his medications.  He recommends physical therapy and continued movement.  He was seen in consultation by physical  "therapy.  Patient was able to assist from bed to chair and take 2-3 steps.  He needs ongoing physical therapy and occupational therapy due to lack of movement long-term.  A bed has been offered at [facility].  He will be discharged to [facility] today for ongoing physical therapy and occupational therapy.    He has chronic anemia.  Iron profile revealed iron saturation 17%.  Ferritin 16.4.  He received IV Venofer and will be placed on oral iron at the time of discharge.  Otherwise, there have been no adjustments in medications during hospital stay.  Hypertension stable on current home medications.    On 2/21/17 he is suitable for discharge and admission to [facility] for ongoing physical therapy and occupational therapy due to decreased activity and decreased ability to ambulate.    Physical Exam on Discharge:  Visit Vitals   • /56 (BP Location: Left arm, Patient Position: Lying)   • Pulse 66   • Temp 97.9 °F (36.6 °C) (Temporal Artery )   • Resp 20   • Ht 72\" (182.9 cm)   • Wt 243 lb 2 oz (110 kg)   • SpO2 94%   • BMI 32.97 kg/m2     Physical Exam  Constitutional: He is oriented to person, place, and time. He appears well-developed and well-nourished.   HENT:   Head: Normocephalic and atraumatic.   Eyes: EOM are normal. Pupils are equal, round, and reactive to light.   Neck: Normal range of motion. Neck supple. No tracheal deviation present.   Cardiovascular: Normal rate, regular rhythm, normal heart sounds and intact distal pulses.   No murmur heard.  Pulmonary/Chest: Effort normal and breath sounds normal. He has no wheezes. He has no rales.   Abdominal: Soft. Bowel sounds are normal. He exhibits no distension.   Musculoskeletal: He exhibits edema trace (Right ankle and foot almost resolved with YONI).   TEDs bilateral lower extremities   Neurological: He is alert and oriented to person, place, and time.   Skin: Skin is warm and dry. " No rash noted.   Psychiatric: He has a normal mood and affect.     Condition on Discharge:  Stable     Discharge Disposition: To [facility]    Discharge Diet:   Diet Instructions     Advance Diet As Tolerated                 As tolerated    Activity at Discharge:   Activity Instructions     Other Instructions (Specify)       TEDS bilateral extremities   Physical therapy and occupational therapy twice daily for progressive acitivity.             As tolerated. Needs progressive physical therapy with walker as tolerated twice daily. Occupational therapy twice daily    Discharge Care Plan / Instructions:   1.  He designates his son, Darrick Max , to serve as a surrogate for healthcare matters should such become necessary. His son is also POWER OF .   2.  The patient is a FULL CODE.   3.  Physical therapy twice daily with progressive activity.  4.  Occupational therapy twice daily.  5.  TEDs bilateral lower extremities.    Discharge Medications:   Darrick Max   Home Medication Instructions LINA:[id number]    Printed on:02/21/17 0722   Medication Information                      acetaminophen (TYLENOL) 325 MG tablet  Take 2 tablets by mouth Every 6 (Six) Hours As Needed for mild pain (1-3) or fever.             amantadine (SYMMETREL) 100 MG capsule  Take 1 capsule by mouth 2 (Two) Times a Day.             aspirin 81 MG EC tablet  Take 81 mg by mouth Daily.             Boswellia-Glucosamine-Vit D (GLUCOSAMINE COMPLEX PO)  Take 1,500 mg by mouth daily with breakfast.             carbidopa-levodopa (SINEMET)  MG per tablet  Take 1 tablet by mouth 3 (Three) Times a Day.             carbidopa-levodopa CR (SINEMET CR)  MG per CR tablet  Take 1 tablet by mouth Every Night.             clonazePAM (KlonoPIN) 0.5 MG tablet  Take 1 tablet by mouth every night.             clopidogrel (PLAVIX) 75 MG tablet  Take 1 tablet by mouth daily.             coenzyme Q10 100 MG capsule  Take  100 mg by mouth daily.             docusate sodium (COLACE) 250 MG capsule  Take 250 mg by mouth daily.             donepezil (ARICEPT) 10 MG tablet  Take 10 mg by mouth every night.             ferrous sulfate (FERROUSUL) 325 (65 FE) MG tablet  Take 1 tablet by mouth Daily With Breakfast.             furosemide (LASIX) 20 MG tablet  Take 20 mg by mouth Daily.             lisinopril (PRINIVIL,ZESTRIL) 20 MG tablet  Take 1 tablet by mouth Daily.             MEGARED OMEGA-3 KRILL OIL PO  Take  by mouth every night.             metoprolol tartrate (LOPRESSOR) 50 MG tablet  Take 50 mg by mouth 2 (two) times a day.             Mirabegron ER (MYRBETRIQ) 50 MG tablet sustained-release 24 hour  Take 50 mg by mouth Daily.             mirtazapine (REMERON) 30 MG tablet  Take 15 mg by mouth Every Night. Takes half nightly             nitroglycerin (NITROSTAT) 0.4 MG SL tablet  Place 1 tablet under the tongue every 5 (five) minutes as needed for chest pain. Take no more than 3 doses in 15 minutes.             pravastatin (PRAVACHOL) 40 MG tablet  Take 20 mg by mouth every night.             tamsulosin (FLOMAX) 0.4 MG capsule 24 hr capsule  Take 1 capsule by mouth Every Night.               Follow-up Appointments:   1.  He will be followed by physician in charge at [facility] during stay.  2.  [facility] Paul [facility].  3.  Keep next scheduled appointment with neurology, MERI Licea    Test Results Pending at Discharge: none    The above documentation resulted from a face-to-face encounter by me Maddie JEREZ, [facility].    MERI Bautista  02/21/17  7:22 AM    Time:  This discharge process required 42 minutes for completion     Plan discussed with Dr. Deng and patient.    Time spent in face-to-face evaluation, chart review, planning and education 42 minutes.  Please note that portions of this note may have been completed with a voice recognition program.  Efforts were made to edit the dictations, but occasionally words are mistranscribed.    I personally evaluated  the patient in conjunction withMERI Perkins and agree with the assessment, treatment plan, and disposition of the patient as recorded by her. I felt patient is appropriate for discharge.     Electronically signed by Dimas Deng MD at 2/21/2017  9:38 AM

## 2017-02-21 NOTE — PLAN OF CARE
Problem: Inpatient Physical Therapy  Goal: Bed Mobility Goal LTG- PT  Outcome: Unable to achieve outcome(s) by discharge Date Met:  02/21/17 02/20/17 0932 02/21/17 1350   Bed Mobility PT LTG   Bed Mobility PT LTG, Date Established 02/20/17 --    Bed Mobility PT LTG, Time to Achieve by discharge --    Bed Mobility PT LTG, Activity Type all bed mobility --    Bed Mobility PT LTG, [place] Level conditional independence --    Bed Mobility PT Goal LTG, Assist Device bed rails --    Bed Mobility PT LTG, Date Goal Reviewed --  02/21/17   Bed Mobility PT LTG, Outcome --  goal not met   Bed Mobility PT LTG, Reason Goal Not Met --  discharged from facility       Goal: Transfer Training Goal 1 LTG- PT  Outcome: Unable to achieve outcome(s) by discharge Date Met:  02/21/17 02/20/17 0932 02/21/17 1350   Transfer Training PT LTG   Transfer Training PT LTG, Date Established 02/20/17 --    Transfer Training PT LTG, Time to Achieve by discharge --    Transfer Training PT LTG, Activity Type bed to chair /chair to bed;sit to stand/stand to sit --    Transfer Training PT LTG, [place] Level minimum assist (75% patient effort);set up required --    Transfer Training PT LTG, Assist Device walker, rolling --    Transfer Training PT LTG, Date Goal Reviewed --  02/21/17   Transfer Training PT LTG, Outcome --  goal not met   Transfer Training PT LTG, Reason Goal Not Met --  discharged from facility       Goal: Gait Training Goal LTG- PT  Outcome: Unable to achieve outcome(s) by discharge Date Met:  02/21/17 02/20/17 0932 02/21/17 1350   Gait Training PT LTG   Gait Training Goal PT LTG, Date Established 02/20/17 --    Gait Training Goal PT LTG, Time to Achieve by discharge --    Gait Training Goal PT LTG, [place] Level minimum assist (75% patient effort) --    Gait Training Goal PT LTG, Assist Device walker, rolling --    Gait Training Goal PT LTG, Distance to Achieve 15ft  --    Gait Training Goal PT LTG, Date Goal  Reviewed --  02/21/17   Gait Training Goal PT LTG, Outcome --  goal not met   Gait Training Goal PT LTG, Reason Goal Not Met --  discharged from facility

## 2017-02-21 NOTE — PLAN OF CARE
Problem: Inpatient Occupational Therapy  Goal: Bed Mobility Goal LTG- OT  Outcome: Unable to achieve outcome(s) by discharge Date Met:  02/21/17 02/20/17 0919 02/21/17 1308   Bed Mobility OT LTG   Bed Mobility OT LTG, Date Established 02/20/17 --    Bed Mobility OT LTG, Time to Achieve by discharge --    Bed Mobility OT LTG, Activity Type all bed mobility --    Bed Mobility OT LTG, [place] Level independent --    Bed Mobility OT LTG, Date Goal Reviewed --  02/21/17   Bed Mobility OT LTG, Outcome --  goal not met   Bed Mobility OT LTG, Reason Goal Not Met --  discharged from facility       Goal: Transfer Training Goal 1 LTG- OT  Outcome: Unable to achieve outcome(s) by discharge Date Met:  02/21/17 02/20/17 0919 02/21/17 1308   Transfer Training OT LTG   Transfer Training OT LTG, Date Established 02/20/17 --    Transfer Training OT LTG, Time to Achieve by discharge --    Transfer Training OT LTG, Activity Type bed to chair /chair to bed;sit to stand/stand to sit;toilet --    Transfer Training OT LTG, [place] Level moderate assist (50% patient effort) --    Transfer Training OT LTG, Assist Device walker, rolling;commode, bedside --    Transfer Training OT LTG, Date Goal Reviewed --  02/21/17   Transfer Training OT LTG, Outcome --  goal not met   Transfer Training OT LTG, Reason Goal Not Met --  discharged from facility       Goal: Strength Goal LTG- OT  Outcome: Unable to achieve outcome(s) by discharge Date Met:  02/21/17 02/20/17 0919 02/21/17 1308   Strength OT LTG   Strength Goal OT LTG, Date Established 02/20/17 --    Strength Goal OT LTG, Time to Achieve by discharge --    Strength Goal OT LTG, Measure to Achieve Pt. will complete BUE strengthening exercises to increase BUE strenghth to 5/5 for completing ADLs.  --    Strength Goal OT LTG, Date Goal Reviewed --  02/21/17   Strength Goal OT LTG, Outcome --  goal not met   Strength Goal OT LTG, Reason Goal Not Met --  discharged from facility

## 2021-03-29 ENCOUNTER — APPOINTMENT (RX ONLY)
Dept: URBAN - METROPOLITAN AREA CLINIC 307 | Facility: CLINIC | Age: 76
Setting detail: DERMATOLOGY
End: 2021-03-29

## 2021-03-29 DIAGNOSIS — L57.0 ACTINIC KERATOSIS: ICD-10-CM

## 2021-03-29 DIAGNOSIS — D485 NEOPLASM OF UNCERTAIN BEHAVIOR OF SKIN: ICD-10-CM

## 2021-03-29 PROBLEM — D48.5 NEOPLASM OF UNCERTAIN BEHAVIOR OF SKIN: Status: ACTIVE | Noted: 2021-03-29

## 2021-03-29 PROCEDURE — 11102 TANGNTL BX SKIN SINGLE LES: CPT

## 2021-03-29 PROCEDURE — ? COUNSELING

## 2021-03-29 PROCEDURE — ? BIOPSY BY SHAVE METHOD

## 2021-03-29 PROCEDURE — 17003 DESTRUCT PREMALG LES 2-14: CPT

## 2021-03-29 PROCEDURE — ? LIQUID NITROGEN

## 2021-03-29 PROCEDURE — 17000 DESTRUCT PREMALG LESION: CPT | Mod: 59

## 2021-03-29 ASSESSMENT — LOCATION ZONE DERM: LOCATION ZONE: FACE

## 2021-03-29 ASSESSMENT — LOCATION SIMPLE DESCRIPTION DERM
LOCATION SIMPLE: RIGHT FOREHEAD
LOCATION SIMPLE: LEFT FOREHEAD
LOCATION SIMPLE: SUPERIOR FOREHEAD

## 2021-03-29 ASSESSMENT — LOCATION DETAILED DESCRIPTION DERM
LOCATION DETAILED: RIGHT FOREHEAD
LOCATION DETAILED: RIGHT SUPERIOR MEDIAL FOREHEAD
LOCATION DETAILED: SUPERIOR MID FOREHEAD
LOCATION DETAILED: RIGHT MEDIAL FOREHEAD
LOCATION DETAILED: LEFT SUPERIOR FOREHEAD
LOCATION DETAILED: LEFT MEDIAL FOREHEAD
LOCATION DETAILED: LEFT FOREHEAD

## 2021-04-21 ENCOUNTER — APPOINTMENT (RX ONLY)
Dept: URBAN - METROPOLITAN AREA CLINIC 307 | Facility: CLINIC | Age: 76
Setting detail: DERMATOLOGY
End: 2021-04-21

## 2021-04-21 PROBLEM — D48.5 NEOPLASM OF UNCERTAIN BEHAVIOR OF SKIN: Status: ACTIVE | Noted: 2021-04-21

## 2021-04-21 PROBLEM — C44.319 BASAL CELL CARCINOMA OF SKIN OF OTHER PARTS OF FACE: Status: ACTIVE | Noted: 2021-04-21

## 2021-04-21 PROCEDURE — ? MOHS SURGERY

## 2021-04-21 PROCEDURE — 17312 MOHS ADDL STAGE: CPT

## 2021-04-21 PROCEDURE — ? BIOPSY BY SHAVE METHOD

## 2021-04-21 PROCEDURE — 17311 MOHS 1 STAGE H/N/HF/G: CPT

## 2021-04-21 PROCEDURE — 11102 TANGNTL BX SKIN SINGLE LES: CPT | Mod: 59

## 2021-04-21 PROCEDURE — 13132 CMPLX RPR F/C/C/M/N/AX/G/H/F: CPT | Mod: 59

## 2021-04-21 NOTE — PROCEDURE: MOHS SURGERY
Therapeutic Information: Selecting Yes will display possible errors in your note based on the variables you have selected. This validation is only offered as a suggestion for you. PLEASE NOTE THAT THE VALIDATION TEXT WILL BE REMOVED WHEN YOU FINALIZE YOUR NOTE. IF YOU WANT TO FAX A PRELIMINARY NOTE YOU WILL NEED TO TOGGLE THIS TO 'NO' IF YOU DO NOT WANT IT IN YOUR FAXED NOTE.

## 2021-04-28 ENCOUNTER — APPOINTMENT (RX ONLY)
Dept: URBAN - METROPOLITAN AREA CLINIC 307 | Facility: CLINIC | Age: 76
Setting detail: DERMATOLOGY
End: 2021-04-28

## 2021-04-28 DIAGNOSIS — Z48.02 ENCOUNTER FOR REMOVAL OF SUTURES: ICD-10-CM

## 2021-04-28 PROCEDURE — ? SUTURE REMOVAL (GLOBAL PERIOD)

## 2021-04-28 PROCEDURE — 99024 POSTOP FOLLOW-UP VISIT: CPT

## 2021-04-28 ASSESSMENT — LOCATION ZONE DERM: LOCATION ZONE: FACE

## 2021-04-28 ASSESSMENT — LOCATION DETAILED DESCRIPTION DERM: LOCATION DETAILED: RIGHT SUPERIOR FOREHEAD

## 2021-04-28 ASSESSMENT — LOCATION SIMPLE DESCRIPTION DERM: LOCATION SIMPLE: RIGHT FOREHEAD

## 2021-04-28 NOTE — PROCEDURE: SUTURE REMOVAL (GLOBAL PERIOD)
Detail Level: Detailed
Add 83328 Cpt? (Important Note: In 2017 The Use Of 30698 Is Being Tracked By Cms To Determine Future Global Period Reimbursement For Global Periods): yes

## 2021-06-30 ENCOUNTER — APPOINTMENT (RX ONLY)
Dept: URBAN - METROPOLITAN AREA CLINIC 307 | Facility: CLINIC | Age: 76
Setting detail: DERMATOLOGY
End: 2021-06-30

## 2021-06-30 PROBLEM — C44.319 BASAL CELL CARCINOMA OF SKIN OF OTHER PARTS OF FACE: Status: ACTIVE | Noted: 2021-06-30

## 2021-06-30 PROCEDURE — 17311 MOHS 1 STAGE H/N/HF/G: CPT

## 2021-06-30 PROCEDURE — ? MOHS SURGERY

## 2021-06-30 PROCEDURE — 13132 CMPLX RPR F/C/C/M/N/AX/G/H/F: CPT

## 2021-06-30 PROCEDURE — 17312 MOHS ADDL STAGE: CPT

## 2021-06-30 NOTE — PROCEDURE: MOHS SURGERY
142 Banner Transposition Flap Text: The defect edges were debeveled with a #15 scalpel blade.  Given the location of the defect and the proximity to free margins a Banner transposition flap was deemed most appropriate.  Using a sterile surgical marker, an appropriate flap drawn around the defect. The area thus outlined was incised deep to adipose tissue with a #15 scalpel blade.  The skin margins were undermined to an appropriate distance in all directions utilizing iris scissors.

## 2021-06-30 NOTE — PROCEDURE: MOHS SURGERY
I will START or STAY ON the medications listed below when I get home from the hospital:  None Trilobed Flap Text: The defect edges were debeveled with a #15 scalpel blade.  Given the location of the defect and the proximity to free margins a trilobed flap was deemed most appropriate.  Using a sterile surgical marker, an appropriate trilobed flap drawn around the defect.    The area thus outlined was incised deep to adipose tissue with a #15 scalpel blade.  The skin margins were undermined to an appropriate distance in all directions utilizing iris scissors.

## 2021-07-07 ENCOUNTER — APPOINTMENT (RX ONLY)
Dept: URBAN - METROPOLITAN AREA CLINIC 307 | Facility: CLINIC | Age: 76
Setting detail: DERMATOLOGY
End: 2021-07-07

## 2021-07-07 DIAGNOSIS — Z48.02 ENCOUNTER FOR REMOVAL OF SUTURES: ICD-10-CM

## 2021-07-07 PROCEDURE — 99024 POSTOP FOLLOW-UP VISIT: CPT

## 2021-07-07 PROCEDURE — ? SUTURE REMOVAL (GLOBAL PERIOD)

## 2021-07-07 ASSESSMENT — LOCATION DETAILED DESCRIPTION DERM: LOCATION DETAILED: INFERIOR MID FOREHEAD

## 2021-07-07 ASSESSMENT — LOCATION ZONE DERM: LOCATION ZONE: FACE

## 2021-07-07 ASSESSMENT — LOCATION SIMPLE DESCRIPTION DERM: LOCATION SIMPLE: INFERIOR FOREHEAD

## 2021-07-07 NOTE — PROCEDURE: SUTURE REMOVAL (GLOBAL PERIOD)
Detail Level: Detailed
Add 14747 Cpt? (Important Note: In 2017 The Use Of 36653 Is Being Tracked By Cms To Determine Future Global Period Reimbursement For Global Periods): yes

## 2022-10-25 ENCOUNTER — APPOINTMENT (RX ONLY)
Dept: URBAN - METROPOLITAN AREA CLINIC 307 | Facility: CLINIC | Age: 77
Setting detail: DERMATOLOGY
End: 2022-10-25

## 2022-10-25 DIAGNOSIS — Z85.828 PERSONAL HISTORY OF OTHER MALIGNANT NEOPLASM OF SKIN: ICD-10-CM

## 2022-10-25 DIAGNOSIS — L57.0 ACTINIC KERATOSIS: ICD-10-CM

## 2022-10-25 PROCEDURE — ? LIQUID NITROGEN

## 2022-10-25 PROCEDURE — 17000 DESTRUCT PREMALG LESION: CPT

## 2022-10-25 PROCEDURE — 99212 OFFICE O/P EST SF 10 MIN: CPT | Mod: 25

## 2022-10-25 PROCEDURE — 17003 DESTRUCT PREMALG LES 2-14: CPT

## 2022-10-25 PROCEDURE — ? COUNSELING

## 2022-10-25 ASSESSMENT — LOCATION SIMPLE DESCRIPTION DERM
LOCATION SIMPLE: RIGHT TEMPLE
LOCATION SIMPLE: LEFT FOREHEAD
LOCATION SIMPLE: RIGHT SCALP
LOCATION SIMPLE: RIGHT CHEEK
LOCATION SIMPLE: LEFT SCALP
LOCATION SIMPLE: RIGHT FOREHEAD

## 2022-10-25 ASSESSMENT — LOCATION DETAILED DESCRIPTION DERM
LOCATION DETAILED: RIGHT MEDIAL FRONTAL SCALP
LOCATION DETAILED: LEFT INFERIOR FOREHEAD
LOCATION DETAILED: RIGHT LATERAL MALAR CHEEK
LOCATION DETAILED: RIGHT MID TEMPLE
LOCATION DETAILED: LEFT MEDIAL FRONTAL SCALP
LOCATION DETAILED: LEFT SUPERIOR MEDIAL FOREHEAD
LOCATION DETAILED: LEFT SUPERIOR FOREHEAD
LOCATION DETAILED: RIGHT SUPERIOR FOREHEAD
LOCATION DETAILED: RIGHT SUPERIOR MEDIAL FOREHEAD
LOCATION DETAILED: RIGHT SUPERIOR LATERAL FOREHEAD

## 2022-10-25 ASSESSMENT — LOCATION ZONE DERM
LOCATION ZONE: FACE
LOCATION ZONE: SCALP

## 2022-10-25 NOTE — PROCEDURE: LIQUID NITROGEN
Show Applicator Variable?: Yes
Detail Level: Detailed
Consent: The patient's consent was obtained including but not limited to risks of crusting, scabbing, blistering, scarring, darker or lighter pigmentary change, recurrence, incomplete removal and infection.
Post-Care Instructions: I reviewed with the patient in detail post-care instructions. Patient is to wear sunprotection, and avoid picking at any of the treated lesions. Pt may apply Vaseline to crusted or scabbing areas.
Duration Of Freeze Thaw-Cycle (Seconds): 0
Number Of Freeze-Thaw Cycles: 2 freeze-thaw cycles
Render Post-Care Instructions In Note?: no
Application Tool (Optional): Liquid Nitrogen Sprayer

## 2022-11-30 NOTE — PROCEDURE: MOHS SURGERY
Length To Time In Minutes Device Was In Place: 10 Clear bilaterally, pupils equal, round and reactive to light.

## 2023-04-24 NOTE — PROCEDURE: MOHS SURGERY
Mohs Histo Method Verbiage: Each section was then chromacoded and processed in the Mohs lab using the Mohs protocol and submitted for frozen section. Mastoid Interpolation Flap Division And Inset Text: Division and inset of the mastoid interpolation flap was performed to achieve optimal aesthetic result, restore normal anatomic appearance and avoid distortion of normal anatomy, expedite and facilitate wound healing, achieve optimal functional result and because linear closure either not possible or would produce suboptimal result. The patient was prepped and draped in the usual manner. The pedicle was infiltrated with local anesthesia. The pedicle was sectioned with a #15 blade. The pedicle was de-bulked and trimmed to match the shape of the defect. Hemostasis was achieved. The flap donor site and free margin of the flap were secured with deep buried sutures and the wound edges were re-approximated.

## 2024-04-25 ENCOUNTER — APPOINTMENT (RX ONLY)
Dept: URBAN - METROPOLITAN AREA CLINIC 307 | Facility: CLINIC | Age: 79
Setting detail: DERMATOLOGY
End: 2024-04-25

## 2024-04-25 DIAGNOSIS — Z02.9 ENCOUNTER FOR ADMINISTRATIVE EXAMINATIONS, UNSPECIFIED: ICD-10-CM

## 2024-06-05 ENCOUNTER — APPOINTMENT (RX ONLY)
Dept: URBAN - METROPOLITAN AREA CLINIC 307 | Facility: CLINIC | Age: 79
Setting detail: DERMATOLOGY
End: 2024-06-05

## 2024-06-05 DIAGNOSIS — Z85.828 PERSONAL HISTORY OF OTHER MALIGNANT NEOPLASM OF SKIN: ICD-10-CM

## 2024-06-05 DIAGNOSIS — D22 MELANOCYTIC NEVI: ICD-10-CM

## 2024-06-05 DIAGNOSIS — L82.1 OTHER SEBORRHEIC KERATOSIS: ICD-10-CM

## 2024-06-05 DIAGNOSIS — D485 NEOPLASM OF UNCERTAIN BEHAVIOR OF SKIN: ICD-10-CM

## 2024-06-05 DIAGNOSIS — L81.4 OTHER MELANIN HYPERPIGMENTATION: ICD-10-CM

## 2024-06-05 DIAGNOSIS — D18.0 HEMANGIOMA: ICD-10-CM

## 2024-06-05 PROBLEM — D18.01 HEMANGIOMA OF SKIN AND SUBCUTANEOUS TISSUE: Status: ACTIVE | Noted: 2024-06-05

## 2024-06-05 PROBLEM — D48.5 NEOPLASM OF UNCERTAIN BEHAVIOR OF SKIN: Status: ACTIVE | Noted: 2024-06-05

## 2024-06-05 PROBLEM — D22.5 MELANOCYTIC NEVI OF TRUNK: Status: ACTIVE | Noted: 2024-06-05

## 2024-06-05 PROCEDURE — ? BIOPSY BY SHAVE METHOD

## 2024-06-05 PROCEDURE — 99213 OFFICE O/P EST LOW 20 MIN: CPT | Mod: 25

## 2024-06-05 PROCEDURE — 11102 TANGNTL BX SKIN SINGLE LES: CPT

## 2024-06-05 PROCEDURE — ? PATIENT SPECIFIC COUNSELING

## 2024-06-05 PROCEDURE — ? FULL BODY SKIN EXAM

## 2024-06-05 PROCEDURE — ? COUNSELING

## 2024-06-05 PROCEDURE — 11103 TANGNTL BX SKIN EA SEP/ADDL: CPT

## 2024-06-05 PROCEDURE — ? TREATMENT REGIMEN

## 2024-06-05 ASSESSMENT — LOCATION DETAILED DESCRIPTION DERM
LOCATION DETAILED: RIGHT MEDIAL INFERIOR CHEST
LOCATION DETAILED: RIGHT MEDIAL UPPER BACK
LOCATION DETAILED: INFERIOR THORACIC SPINE
LOCATION DETAILED: RIGHT INFERIOR TEMPLE

## 2024-06-05 ASSESSMENT — LOCATION ZONE DERM
LOCATION ZONE: FACE
LOCATION ZONE: TRUNK

## 2024-06-05 ASSESSMENT — LOCATION SIMPLE DESCRIPTION DERM
LOCATION SIMPLE: RIGHT TEMPLE
LOCATION SIMPLE: RIGHT UPPER BACK
LOCATION SIMPLE: UPPER BACK
LOCATION SIMPLE: CHEST

## 2024-06-05 NOTE — PROCEDURE: PATIENT SPECIFIC COUNSELING
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
Detail Level: Zone

## 2024-08-13 ENCOUNTER — APPOINTMENT (RX ONLY)
Dept: URBAN - METROPOLITAN AREA CLINIC 307 | Facility: CLINIC | Age: 79
Setting detail: DERMATOLOGY
End: 2024-08-13

## 2024-08-13 DIAGNOSIS — L91.0 HYPERTROPHIC SCAR: ICD-10-CM

## 2024-08-13 PROBLEM — C44.319 BASAL CELL CARCINOMA OF SKIN OF OTHER PARTS OF FACE: Status: ACTIVE | Noted: 2024-08-13

## 2024-08-13 PROCEDURE — ? PATIENT SPECIFIC COUNSELING

## 2024-08-13 PROCEDURE — ? CRYOSURGICAL DESTRUCTION

## 2024-08-13 PROCEDURE — 99212 OFFICE O/P EST SF 10 MIN: CPT | Mod: 25

## 2024-08-13 PROCEDURE — 17280 DSTR MAL LS F/E/E/N/L/M .5/<: CPT

## 2024-08-13 PROCEDURE — ? COUNSELING

## 2024-08-13 ASSESSMENT — LOCATION DETAILED DESCRIPTION DERM: LOCATION DETAILED: RIGHT MEDIAL INFERIOR CHEST

## 2024-08-13 ASSESSMENT — LOCATION ZONE DERM: LOCATION ZONE: TRUNK

## 2024-08-13 ASSESSMENT — LOCATION SIMPLE DESCRIPTION DERM: LOCATION SIMPLE: CHEST

## 2024-08-13 NOTE — PROCEDURE: CRYOSURGICAL DESTRUCTION
Detail Level: Detailed
Add Intralesional Injection: No
Medication Injected: 5-Fluorouracil
Total Volume (Ccs): 1
Anesthesia Volume In Cc: 0
Number Of Freeze-Thaw Cycles: 2 freeze-thaw cycles
Total Time In Minutes: 0.5 minutes
Additional Information: (Optional): The wound was cleaned, and a dressing was applied.  The patient received detailed post-op instructions.
Pre-Procedure: The surgical site was antiseptically prepared.
Consent was obtained from the patient. The risks and benefits to therapy were discussed in detail. Specifically, the risks of infection, scarring, bleeding, prolonged wound healing, incomplete removal, allergy to anesthesia, nerve injury and recurrence were addressed. Alternatives to liquid nitrogen, such as ED&C, surgical removal, XRT were also discussed.  Prior to the procedure, the treatment site was clearly identified and confirmed by the patient. All components of Universal Protocol/PAUSE Rule completed.
Render Post-Care In The Note: Yes
Post-Care Instructions: I reviewed with the patient in detail post-care instructions. Patient is to keep the area dry for 48 hours, and not to engage in any heavy lifting, exercise, or swimming for the next 14 days. Should the patient develop any fevers, chills, bleeding, severe pain patient will contact the office immediately.
Bill As A Line Item Or As Units: Line Item